# Patient Record
Sex: FEMALE | Race: BLACK OR AFRICAN AMERICAN | NOT HISPANIC OR LATINO | Employment: UNEMPLOYED | URBAN - NONMETROPOLITAN AREA
[De-identification: names, ages, dates, MRNs, and addresses within clinical notes are randomized per-mention and may not be internally consistent; named-entity substitution may affect disease eponyms.]

---

## 2021-11-28 ENCOUNTER — HISTORICAL (OUTPATIENT)
Dept: ADMINISTRATIVE | Facility: HOSPITAL | Age: 51
End: 2021-11-28

## 2021-11-29 ENCOUNTER — HISTORICAL (OUTPATIENT)
Dept: ADMINISTRATIVE | Facility: HOSPITAL | Age: 51
End: 2021-11-29

## 2021-12-01 ENCOUNTER — HISTORICAL (OUTPATIENT)
Dept: ADMINISTRATIVE | Facility: HOSPITAL | Age: 51
End: 2021-12-01

## 2022-11-02 ENCOUNTER — HISTORICAL (OUTPATIENT)
Dept: ADMINISTRATIVE | Facility: HOSPITAL | Age: 52
End: 2022-11-02

## 2022-11-14 DIAGNOSIS — G56.03 CARPAL TUNNEL SYNDROME, BILATERAL: Primary | ICD-10-CM

## 2022-11-28 ENCOUNTER — OFFICE VISIT (OUTPATIENT)
Dept: ORTHOPEDICS | Facility: CLINIC | Age: 52
End: 2022-11-28
Payer: MEDICAID

## 2022-11-28 VITALS — HEIGHT: 62 IN | BODY MASS INDEX: 43.15 KG/M2 | WEIGHT: 234.5 LBS

## 2022-11-28 DIAGNOSIS — G56.03 CARPAL TUNNEL SYNDROME, BILATERAL: Primary | ICD-10-CM

## 2022-11-28 PROCEDURE — 3008F BODY MASS INDEX DOCD: CPT | Mod: CPTII,S$GLB,, | Performed by: ORTHOPAEDIC SURGERY

## 2022-11-28 PROCEDURE — 20526 CARPAL TUNNEL: ICD-10-PCS | Mod: 50,S$GLB,, | Performed by: ORTHOPAEDIC SURGERY

## 2022-11-28 PROCEDURE — 4010F PR ACE/ARB THEARPY RXD/TAKEN: ICD-10-PCS | Mod: CPTII,S$GLB,, | Performed by: ORTHOPAEDIC SURGERY

## 2022-11-28 PROCEDURE — 99202 OFFICE O/P NEW SF 15 MIN: CPT | Mod: 25,S$GLB,, | Performed by: ORTHOPAEDIC SURGERY

## 2022-11-28 PROCEDURE — 1159F MED LIST DOCD IN RCRD: CPT | Mod: CPTII,S$GLB,, | Performed by: ORTHOPAEDIC SURGERY

## 2022-11-28 PROCEDURE — 20526 THER INJECTION CARP TUNNEL: CPT | Mod: 50,S$GLB,, | Performed by: ORTHOPAEDIC SURGERY

## 2022-11-28 PROCEDURE — 1159F PR MEDICATION LIST DOCUMENTED IN MEDICAL RECORD: ICD-10-PCS | Mod: CPTII,S$GLB,, | Performed by: ORTHOPAEDIC SURGERY

## 2022-11-28 PROCEDURE — 4010F ACE/ARB THERAPY RXD/TAKEN: CPT | Mod: CPTII,S$GLB,, | Performed by: ORTHOPAEDIC SURGERY

## 2022-11-28 PROCEDURE — 99202 PR OFFICE/OUTPT VISIT, NEW, LEVL II, 15-29 MIN: ICD-10-PCS | Mod: 25,S$GLB,, | Performed by: ORTHOPAEDIC SURGERY

## 2022-11-28 PROCEDURE — 3008F PR BODY MASS INDEX (BMI) DOCUMENTED: ICD-10-PCS | Mod: CPTII,S$GLB,, | Performed by: ORTHOPAEDIC SURGERY

## 2022-11-28 PROCEDURE — 1160F PR REVIEW ALL MEDS BY PRESCRIBER/CLIN PHARMACIST DOCUMENTED: ICD-10-PCS | Mod: CPTII,S$GLB,, | Performed by: ORTHOPAEDIC SURGERY

## 2022-11-28 PROCEDURE — 1160F RVW MEDS BY RX/DR IN RCRD: CPT | Mod: CPTII,S$GLB,, | Performed by: ORTHOPAEDIC SURGERY

## 2022-11-28 RX ORDER — NAPROXEN 500 MG/1
500 TABLET ORAL 2 TIMES DAILY PRN
COMMUNITY
Start: 2022-10-07

## 2022-11-28 RX ORDER — ATORVASTATIN CALCIUM 20 MG/1
20 TABLET, FILM COATED ORAL NIGHTLY
COMMUNITY
Start: 2022-11-05 | End: 2023-03-20

## 2022-11-28 RX ORDER — NIFEDIPINE 60 MG/1
60 TABLET, EXTENDED RELEASE ORAL DAILY
COMMUNITY
Start: 2022-11-05

## 2022-11-28 RX ORDER — ROSUVASTATIN CALCIUM 20 MG/1
20 TABLET, COATED ORAL NIGHTLY
COMMUNITY
Start: 2022-11-09 | End: 2023-03-20 | Stop reason: SDUPTHER

## 2022-11-28 RX ORDER — FUROSEMIDE 40 MG/1
40 TABLET ORAL DAILY
COMMUNITY
Start: 2022-11-15

## 2022-11-28 RX ORDER — POTASSIUM CHLORIDE 20 MEQ/1
20 TABLET, EXTENDED RELEASE ORAL DAILY
COMMUNITY
Start: 2022-11-15

## 2022-11-28 RX ORDER — LABETALOL 100 MG/1
100 TABLET, FILM COATED ORAL 2 TIMES DAILY
COMMUNITY
Start: 2022-11-05

## 2022-11-28 NOTE — PROGRESS NOTES
Subjective:      Patient ID: Kaylyn Whyte is a 52 y.o. female.    Chief Complaint: Pain of the Right Hand and Pain of the Left Hand    HPI 52-year-old lady comes in with a 1 year history of bilateral hand numbness worse on the right than left.  She is right-hand-dominant.  She has EMG and nerve conduction studies showing moderate carpal tunnel syndrome on the right and severe on the left.    Review of Systems   Constitutional: Negative for fever and weight loss.   Cardiovascular:  Negative for chest pain and leg swelling.   Musculoskeletal:  Negative for arthritis, joint pain, joint swelling, muscle weakness and stiffness.   Gastrointestinal:  Negative for change in bowel habit.   Genitourinary:  Negative for bladder incontinence and hematuria.   Neurological:  Positive for numbness, paresthesias and sensory change. Negative for focal weakness.       Objective:      Active and passive range motion of both wrists is normal.  She has decreased sensation to light touch in the median nerve distribution.  There is normal capillary refill.  She has a positive carpal compression test bilaterally      Ortho/SPM Exam            Assessment:       Encounter Diagnosis   Name Primary?    Carpal tunnel syndrome, bilateral           Plan:       Kaylyn was seen today for pain and pain.    Diagnoses and all orders for this visit:    Carpal tunnel syndrome, bilateral  -     Ambulatory referral/consult to Orthopedics    I discussed with her nonoperative and operative treatment.  She would like to try injections 1st.  This is done today.  Return 3 weeks p.r.n.

## 2022-11-28 NOTE — PROCEDURES
Carpal Tunnel    Date/Time: 11/28/2022 9:30 AM  Performed by: Rancho Zamudio MD  Authorized by: Rancho Zamudio MD     Consent Done?:  Yes (Verbal)  Prep: patient was prepped and draped in usual sterile fashion      Local anesthesia used?: No    Location:  Wrist  Site:  R carpal tunnel and L carpal tunnel  Needle size:  25 G  Approach:  Volar  Medications:  5 mg triamcinolone acetonide 10 mg/mL; 5 mg triamcinolone acetonide 10 mg/mL  Patient tolerance:  Patient tolerated the procedure well with no immediate complications

## 2022-12-19 ENCOUNTER — OFFICE VISIT (OUTPATIENT)
Dept: ORTHOPEDICS | Facility: CLINIC | Age: 52
End: 2022-12-19
Payer: MEDICAID

## 2022-12-19 DIAGNOSIS — G56.03 CARPAL TUNNEL SYNDROME, BILATERAL: Primary | ICD-10-CM

## 2022-12-19 PROCEDURE — 4010F ACE/ARB THERAPY RXD/TAKEN: CPT | Mod: CPTII,S$GLB,, | Performed by: ORTHOPAEDIC SURGERY

## 2022-12-19 PROCEDURE — 1160F PR REVIEW ALL MEDS BY PRESCRIBER/CLIN PHARMACIST DOCUMENTED: ICD-10-PCS | Mod: CPTII,S$GLB,, | Performed by: ORTHOPAEDIC SURGERY

## 2022-12-19 PROCEDURE — 1159F PR MEDICATION LIST DOCUMENTED IN MEDICAL RECORD: ICD-10-PCS | Mod: CPTII,S$GLB,, | Performed by: ORTHOPAEDIC SURGERY

## 2022-12-19 PROCEDURE — 99212 OFFICE O/P EST SF 10 MIN: CPT | Mod: S$GLB,,, | Performed by: ORTHOPAEDIC SURGERY

## 2022-12-19 PROCEDURE — 99212 PR OFFICE/OUTPT VISIT, EST, LEVL II, 10-19 MIN: ICD-10-PCS | Mod: S$GLB,,, | Performed by: ORTHOPAEDIC SURGERY

## 2022-12-19 PROCEDURE — 4010F PR ACE/ARB THEARPY RXD/TAKEN: ICD-10-PCS | Mod: CPTII,S$GLB,, | Performed by: ORTHOPAEDIC SURGERY

## 2022-12-19 PROCEDURE — 1159F MED LIST DOCD IN RCRD: CPT | Mod: CPTII,S$GLB,, | Performed by: ORTHOPAEDIC SURGERY

## 2022-12-19 PROCEDURE — 1160F RVW MEDS BY RX/DR IN RCRD: CPT | Mod: CPTII,S$GLB,, | Performed by: ORTHOPAEDIC SURGERY

## 2022-12-19 NOTE — PROGRESS NOTES
Subjective:      Patient ID: Kaylyn Whyte is a 52 y.o. female.    Chief Complaint: Pain of the Right Hand and Pain of the Left Hand    HPI 52-year-old lady comes in for recheck after injection in her carpal tunnels.  She states her left-hand is doing fine she still has some symptoms on the right.    ROS unchanged from prior visit      Objective:      Active and passive range motion of both wrists is normal.  She has a positive carpal compression test on the right and decreased sensation to light touch in the median nerve distribution.      Ortho/SPM Exam            Assessment:       Encounter Diagnosis   Name Primary?    Carpal tunnel syndrome, bilateral Yes          Plan:       Kaylyn was seen today for pain and pain.    Diagnoses and all orders for this visit:    Carpal tunnel syndrome, bilateral        She will contact us if she decides to proceed with carpal tunnel release.  Return

## 2023-02-03 DIAGNOSIS — R10.31 RIGHT LOWER QUADRANT PAIN: Primary | ICD-10-CM

## 2023-02-03 DIAGNOSIS — Z90.710 HISTORY OF HYSTERECTOMY: ICD-10-CM

## 2023-03-08 PROBLEM — G56.00 CARPAL TUNNEL SYNDROME: Status: ACTIVE | Noted: 2023-03-08

## 2023-03-08 PROBLEM — Z00.01 ABNORMAL PHYSICAL EVALUATION: Status: ACTIVE | Noted: 2023-03-08

## 2023-03-08 PROBLEM — I10 HYPERTENSION: Status: ACTIVE | Noted: 2023-03-08

## 2023-03-08 PROBLEM — E78.5 HYPERLIPIDEMIA: Status: ACTIVE | Noted: 2023-03-08

## 2023-03-08 PROBLEM — Z90.710 H/O TOTAL HYSTERECTOMY: Status: ACTIVE | Noted: 2023-03-08

## 2023-03-08 PROBLEM — E66.01 MORBID OBESITY: Status: ACTIVE | Noted: 2023-03-08

## 2023-03-08 PROCEDURE — 80061 LIPID PANEL: CPT | Performed by: NURSE PRACTITIONER

## 2023-03-08 PROCEDURE — 80053 COMPREHEN METABOLIC PANEL: CPT | Performed by: NURSE PRACTITIONER

## 2023-03-17 RX ORDER — ALBUTEROL SULFATE 90 UG/1
1 INHALANT RESPIRATORY (INHALATION) EVERY 6 HOURS PRN
COMMUNITY
Start: 2022-10-07 | End: 2023-03-20

## 2023-03-20 ENCOUNTER — OFFICE VISIT (OUTPATIENT)
Dept: FAMILY MEDICINE | Facility: CLINIC | Age: 53
End: 2023-03-20
Payer: MEDICAID

## 2023-03-20 ENCOUNTER — TELEPHONE (OUTPATIENT)
Dept: FAMILY MEDICINE | Facility: CLINIC | Age: 53
End: 2023-03-20

## 2023-03-20 DIAGNOSIS — Z13.31 POSITIVE DEPRESSION SCREENING: Primary | ICD-10-CM

## 2023-03-20 DIAGNOSIS — G56.03 BILATERAL CARPAL TUNNEL SYNDROME: ICD-10-CM

## 2023-03-20 DIAGNOSIS — E78.5 HYPERLIPIDEMIA, UNSPECIFIED HYPERLIPIDEMIA TYPE: ICD-10-CM

## 2023-03-20 DIAGNOSIS — R40.0 HAS DAYTIME DROWSINESS: ICD-10-CM

## 2023-03-20 DIAGNOSIS — R06.81 WITNESSED APNEIC SPELLS: ICD-10-CM

## 2023-03-20 DIAGNOSIS — F32.1 MODERATE MAJOR DEPRESSION: ICD-10-CM

## 2023-03-20 DIAGNOSIS — R06.83 SNORES: ICD-10-CM

## 2023-03-20 PROCEDURE — 1159F PR MEDICATION LIST DOCUMENTED IN MEDICAL RECORD: ICD-10-PCS | Mod: CPTII,95,, | Performed by: NURSE PRACTITIONER

## 2023-03-20 PROCEDURE — 1160F RVW MEDS BY RX/DR IN RCRD: CPT | Mod: CPTII,95,, | Performed by: NURSE PRACTITIONER

## 2023-03-20 PROCEDURE — 99213 OFFICE O/P EST LOW 20 MIN: CPT | Mod: 95,,, | Performed by: NURSE PRACTITIONER

## 2023-03-20 PROCEDURE — 99213 PR OFFICE/OUTPT VISIT, EST, LEVL III, 20-29 MIN: ICD-10-PCS | Mod: 95,,, | Performed by: NURSE PRACTITIONER

## 2023-03-20 PROCEDURE — 1159F MED LIST DOCD IN RCRD: CPT | Mod: CPTII,95,, | Performed by: NURSE PRACTITIONER

## 2023-03-20 PROCEDURE — 1160F PR REVIEW ALL MEDS BY PRESCRIBER/CLIN PHARMACIST DOCUMENTED: ICD-10-PCS | Mod: CPTII,95,, | Performed by: NURSE PRACTITIONER

## 2023-03-20 RX ORDER — ROSUVASTATIN CALCIUM 40 MG/1
40 TABLET, COATED ORAL NIGHTLY
Qty: 30 TABLET | Refills: 7 | Status: SHIPPED | OUTPATIENT
Start: 2023-03-20 | End: 2023-05-04 | Stop reason: SDUPTHER

## 2023-03-20 NOTE — ASSESSMENT & PLAN NOTE
Lipid Panel:  Lab Results   Component Value Date    CHOL 197 03/08/2023    HDL 58 03/08/2023    DLDL 113.3 (H) 03/08/2023    TRIG 53 03/08/2023    AST 26 03/08/2023    ALT 24 03/08/2023    ALKPHOS 99 03/08/2023    LABPROT 7.6 03/08/2023    ALBUMIN 4.4 03/08/2023

## 2023-03-20 NOTE — ASSESSMENT & PLAN NOTE
Was receiving injections for carpal tunnel with Dr. Zamudio with improvement in symptoms. He has retired. Patient requesting new referral.

## 2023-03-20 NOTE — TELEPHONE ENCOUNTER
----- Message from Stacia Corona LPN sent at 3/20/2023  8:08 AM CDT -----  Regarding: apt     Follow up for 3 month f/u cholesterol, Labs prior to apt. Call sooner if needed

## 2023-03-20 NOTE — PROGRESS NOTES
I have used clinical judgement based on duration and functional status to consider definite necessity for treatment. Patient ID: Kaylyn Whyte  : 1970     Chief Complaint: Follow-up (Cholesterol )    Allergies: Patient is allergic to penicillins.     This is a telemedicine note. Patient was treated using telemedicine, real time audio and video, according to [facility] protocols. [facility], conducted the visit from the [facility]. The patient participated in the visit at a non-[facility] location selected by the patient, identified below. I am licensed in the state where the patient stated they are located. The patient stated that they understood and accepted the privacy and security risks to their information at their location. The patient verbally consented to proceed with a telemedicine visit. This visit is not recorded. Patient was located at the patient's home.     Video Time Documentation:  Time of call:   Spent 22 minutes with patient face to face discussed health concerns. More than 50% of this time was spent in counseling and coordination of care.    History of Present Illness:  The patient is a 52 y.o. Black or  female who scheduled telemedicine visit for chief complaint of Follow-up (Cholesterol )   Patient is requesting referral to new orthopedic. Was seeing Dr. Zamudio for injections for bilateral carpal tunnel but he has retired.     Follow-up  This is a chronic problem. The current episode started more than 1 year ago. The problem has been gradually improving. Associated symptoms include arthralgias, headaches and joint swelling. Pertinent negatives include no chest pain, neck pain, vomiting or weakness.   Hyperlipidemia  This is a chronic problem. The current episode started more than 1 year ago. Exacerbating diseases include obesity. She has no history of chronic renal disease, diabetes, hypothyroidism, liver disease or nephrotic syndrome. Pertinent  negatives include no chest pain, focal sensory loss, focal weakness or shortness of breath. Risk factors for coronary artery disease include post-menopausal, a sedentary lifestyle, obesity, hypertension, dyslipidemia and family history.   Depression  Visit Type: initial  Onset of symptoms: 1 to 6 months ago  Progression since onset: gradually improving  Patient presents with the following symptoms: depressed mood, fatigue, insomnia, irritability, palpitations and restlessness.  Patient is not experiencing: anhedonia, chest pain, choking sensation, compulsions, confusion, decreased concentration, dizziness, dry mouth, excessive worry, feelings of hopelessness, feelings of worthlessness, hypersomnia, hyperventilation, malaise, memory impairment, muscle tension, nausea, nervousness/anxiety, obsessions, panic, psychomotor agitation, psychomotor retardation, shortness of breath, suicidal ideas, suicidal planning and thoughts of death.  Frequency of symptoms: most days   Severity: moderate   Aggravated by: family issues  Sleep quality: non-restorative  Risk factors: family history (grieving recent death of her brother from MI)  No history of: bipolar disorder, chronic lung disease, suicide attempt and substance abuse  Treatment tried: nothing       Past Medical History:  has a past medical history of Carpal tunnel syndrome, Hyperlipidemia, and Hypertension.    Social History:  reports that she has never smoked. She has never used smokeless tobacco. She reports that she does not currently use alcohol.    Family History: family history is not on file.    Care Team: Patient Care Team:  ABEBE Ibarra as PCP - General (Internal Medicine)  Ronaldo Villanueva MD as Consulting Physician (Obstetrics and Gynecology)  [facility]     Current Medications:  Current Outpatient Medications   Medication Instructions    furosemide (LASIX) 40 mg, Oral, Daily    labetaloL (NORMODYNE) 100 mg, Oral, 2 times daily    naproxen (NAPROSYN)  500 mg, Oral, 2 times daily PRN    NIFEdipine (PROCARDIA-XL) 60 mg, Oral, Daily    potassium chloride SA (K-DUR,KLOR-CON) 20 MEQ tablet 20 mEq, Oral, Daily    rosuvastatin (CRESTOR) 40 mg, Oral, Nightly       Patient is allergic to penicillins.     Review of Systems   Constitutional:  Positive for activity change and irritability. Negative for unexpected weight change.   HENT:  Positive for trouble swallowing. Negative for hearing loss and rhinorrhea.    Eyes:  Positive for visual disturbance. Negative for discharge.   Respiratory:  Negative for choking, chest tightness, shortness of breath and wheezing.    Cardiovascular:  Positive for palpitations. Negative for chest pain.   Gastrointestinal:  Positive for constipation. Negative for blood in stool, diarrhea and vomiting.   Endocrine: Positive for polydipsia and polyuria.   Genitourinary:  Negative for difficulty urinating, dysuria, hematuria and menstrual problem.   Musculoskeletal:  Positive for arthralgias and joint swelling. Negative for neck pain.   Neurological:  Positive for headaches. Negative for focal weakness and weakness.   Psychiatric/Behavioral:  Positive for depression and dysphoric mood. Negative for confusion, decreased concentration, substance abuse and suicidal ideas. The patient has insomnia. The patient is not nervous/anxious.       There were no vitals taken for this visit.    Physical Exam: LIMITED DUE TO TELEMEDICINE RESTRICTIONS.  General: Well nourished, Alert and oriented, No acute distress.  ENMT: Sclera non-icteric, conversational hearing normal.  Respiratory: Non-labored respirations, no dyspnea with conversation.  Musculoskeletal: normal movement of all extremities that are visible in video frame  Integumentary:  No visible suspicious lesions or rashes. No diaphoresis noted.   Psychiatric: Normal interaction, Coherent speech, Euthymic mood, Appropriate affect     Assessment & Plan:  1. Positive depression screening  Comments:  I have  reviewed the positive depression score which warrants active treatment with psychotherapy and/or medications.    2. Hyperlipidemia, unspecified hyperlipidemia type  Overview:  Taking rosuvastatin 20, increase to 40mg and repeat labs in 3-4 months    Assessment & Plan:  Lipid Panel:  Lab Results   Component Value Date    CHOL 197 03/08/2023    HDL 58 03/08/2023    DLDL 113.3 (H) 03/08/2023    TRIG 53 03/08/2023    AST 26 03/08/2023    ALT 24 03/08/2023    ALKPHOS 99 03/08/2023    LABPROT 7.6 03/08/2023    ALBUMIN 4.4 03/08/2023          Orders:  -     rosuvastatin (CRESTOR) 40 MG Tab; Take 1 tablet (40 mg total) by mouth every evening.  Dispense: 30 tablet; Refill: 7    3. Moderate major depression  Overview:  PHQ9 3/20/2023   Total Score 14   patient declines medication, provided patient with contact information to schedule apt with counselor      4. Snores  -     Ambulatory referral/consult to Sleep Disorders; Future; Expected date: 03/27/2023    5. Witnessed apneic spells  -     Ambulatory referral/consult to Sleep Disorders; Future; Expected date: 03/27/2023    6. Bilateral carpal tunnel syndrome  Assessment & Plan:  Was receiving injections for carpal tunnel with Dr. Zamudio with improvement in symptoms. He has retired. Patient requesting new referral.     Orders:  -     Ambulatory referral/consult to Orthopedics; Future; Expected date: 03/27/2023    7. Has daytime drowsiness  -     Ambulatory referral/consult to Sleep Disorders; Future; Expected date: 03/27/2023         Future Appointments   Date Time Provider Department Center   4/5/2023  3:00 PM Ronaldo Villanueva MD [facility]   11/6/2023  8:45 AM LAB, [facility] LABORATORY DRAW [facility]   11/13/2023  1:30 PM ABEBE Ibarra [facility]       Follow up for 3 month f/u cholesterol, Labs prior to apt. Call sooner if needed.    Lab Frequency Next Occurrence   Ambulatory referral/consult to Obstetrics / Gynecology Once  02/10/2023   Ambulatory referral/consult to Orthopedics Once 03/27/2023   Ambulatory referral/consult to Sleep Disorders Once 03/27/2023   Comprehensive Metabolic Panel Once 06/20/2023   Lipid Panel Once 06/20/2023       ABEBE BAKER

## 2023-04-05 ENCOUNTER — OFFICE VISIT (OUTPATIENT)
Dept: OBSTETRICS AND GYNECOLOGY | Facility: CLINIC | Age: 53
End: 2023-04-05
Payer: MEDICAID

## 2023-04-05 VITALS
HEIGHT: 63 IN | SYSTOLIC BLOOD PRESSURE: 136 MMHG | WEIGHT: 240.06 LBS | BODY MASS INDEX: 42.54 KG/M2 | DIASTOLIC BLOOD PRESSURE: 86 MMHG

## 2023-04-05 DIAGNOSIS — R10.31 RIGHT LOWER QUADRANT PAIN: Primary | ICD-10-CM

## 2023-04-05 DIAGNOSIS — Z90.710 HISTORY OF HYSTERECTOMY: ICD-10-CM

## 2023-04-05 PROCEDURE — 3075F SYST BP GE 130 - 139MM HG: CPT | Mod: CPTII,,, | Performed by: OBSTETRICS & GYNECOLOGY

## 2023-04-05 PROCEDURE — 3079F PR MOST RECENT DIASTOLIC BLOOD PRESSURE 80-89 MM HG: ICD-10-PCS | Mod: CPTII,,, | Performed by: OBSTETRICS & GYNECOLOGY

## 2023-04-05 PROCEDURE — 1159F PR MEDICATION LIST DOCUMENTED IN MEDICAL RECORD: ICD-10-PCS | Mod: CPTII,,, | Performed by: OBSTETRICS & GYNECOLOGY

## 2023-04-05 PROCEDURE — 99204 PR OFFICE/OUTPT VISIT, NEW, LEVL IV, 45-59 MIN: ICD-10-PCS | Mod: ,,, | Performed by: OBSTETRICS & GYNECOLOGY

## 2023-04-05 PROCEDURE — 3008F PR BODY MASS INDEX (BMI) DOCUMENTED: ICD-10-PCS | Mod: CPTII,,, | Performed by: OBSTETRICS & GYNECOLOGY

## 2023-04-05 PROCEDURE — 1159F MED LIST DOCD IN RCRD: CPT | Mod: CPTII,,, | Performed by: OBSTETRICS & GYNECOLOGY

## 2023-04-05 PROCEDURE — 3008F BODY MASS INDEX DOCD: CPT | Mod: CPTII,,, | Performed by: OBSTETRICS & GYNECOLOGY

## 2023-04-05 PROCEDURE — 99204 OFFICE O/P NEW MOD 45 MIN: CPT | Mod: ,,, | Performed by: OBSTETRICS & GYNECOLOGY

## 2023-04-05 PROCEDURE — 3075F PR MOST RECENT SYSTOLIC BLOOD PRESS GE 130-139MM HG: ICD-10-PCS | Mod: CPTII,,, | Performed by: OBSTETRICS & GYNECOLOGY

## 2023-04-05 PROCEDURE — 3079F DIAST BP 80-89 MM HG: CPT | Mod: CPTII,,, | Performed by: OBSTETRICS & GYNECOLOGY

## 2023-04-05 NOTE — PROGRESS NOTES
Chief Complaint     Right sided pain    HPI:     Patient is a 52 y.o.  presents today as a new patient here today with complaints of right sided pelvic pain x4 months.  S/p Hyst BSO  for menorrhagia and dysmenorrhea. Denies hx of abnormal pap, STD. Reports RLQ pain since hysterectomy, states has been worsening over the past 4 months. States in the last 2 months has noticed pain to radiate towards lower back. Not currently sexually active. Denies vaginal discharge, odor, bleeding. Will take tylenol as needed, does not noticed relief in pain. Reports pain to be 6 out of 10 daily and 10 out of 10 occ. Does not notice change in pain with BM. Denies recent pelvic u/s.      S/P Hyst BS&O  MMG 2022, Benign       Past Medical History:   Diagnosis Date    Carpal tunnel syndrome 3/8/2023    Hyperlipidemia 3/8/2023    Taking rosuvastatin 20    Hypertension        Past Surgical History:   Procedure Laterality Date    TOTAL ABDOMINAL HYSTERECTOMY W/ BILATERAL SALPINGOOPHORECTOMY         Family History   Problem Relation Age of Onset    No Known Problems Paternal Grandfather     No Known Problems Paternal Grandmother     Thyroid cancer Maternal Grandmother     No Known Problems Maternal Grandfather     No Known Problems Father     No Known Problems Mother     No Known Problems Brother     No Known Problems Sister        OB History          2    Para   2    Term   2            AB        Living   2         SAB        IAB        Ectopic        Multiple        Live Births   2                 Current Outpatient Medications on File Prior to Visit   Medication Sig Dispense Refill    furosemide (LASIX) 40 MG tablet Take 40 mg by mouth once daily.      labetaloL (NORMODYNE) 100 MG tablet Take 100 mg by mouth 2 (two) times daily.      naproxen (NAPROSYN) 500 MG tablet Take 500 mg by mouth 2 (two) times daily as needed.      NIFEdipine (PROCARDIA-XL) 60 MG (OSM) 24 hr tablet Take 60 mg by mouth once  "daily.      potassium chloride SA (K-DUR,KLOR-CON) 20 MEQ tablet Take 20 mEq by mouth once daily.      rosuvastatin (CRESTOR) 40 MG Tab Take 1 tablet (40 mg total) by mouth every evening. 30 tablet 7     No current facility-administered medications on file prior to visit.       Review of Systems:       Review of Systems   Constitutional:  Negative for chills and fever.   Gastrointestinal:  Negative for abdominal pain, constipation and diarrhea.   Genitourinary:  Positive for pelvic pain. Negative for bladder incontinence, decreased libido, dysmenorrhea, dyspareunia, dysuria, flank pain, frequency, genital sores, hematuria, hot flashes, menorrhagia, menstrual problem, urgency, vaginal bleeding, vaginal discharge, vaginal pain, urinary incontinence, postcoital bleeding, postmenopausal bleeding, vaginal dryness and vaginal odor.      Physical Exam:    /86   Ht 5' 2.99" (1.6 m)   Wt 108.9 kg (240 lb 1.3 oz)   LMP  (LMP Unknown) Comment: HYST BS&O  BMI 42.54 kg/m²     Physical Exam   General Exam:    General Appearance: alert, in no acute distress, normal, well nourished.  Psych:  Orientation: time, place, person.  Mood/Affect: Normal   Abdomen:  - Soft. Non-tender. No rebound tenderness or guardingNo masses. Liver normal. Spleen normal. No hernia palpable.  Pelvis:   - Vulva: Normal   - Perianal skin: Normal   - Urethra: Normal meatus. Q-tip: Not performed   - Vagina: NormalVaginal discharge present: . Cystocele: Absent. Rectocele: Absent   - vaginal cuff intact.   Mild right adnexal tenderness.       Assessment:   1. Right lower quadrant pain  -     Ambulatory referral/consult to Obstetrics / Gynecology  -     US Pelvis Comp with Transvag NON-OB (xpd; Future; Expected date: 04/06/2023    2. History of hysterectomy  -     Ambulatory referral/consult to Obstetrics / Gynecology       Plan:       US pelvis  Discussed potential sources of pelvic pain  RTC 2 weeks for f/u      "

## 2023-04-12 ENCOUNTER — PATIENT MESSAGE (OUTPATIENT)
Dept: FAMILY MEDICINE | Facility: CLINIC | Age: 53
End: 2023-04-12
Payer: MEDICAID

## 2023-04-12 ENCOUNTER — HOSPITAL ENCOUNTER (OUTPATIENT)
Dept: RADIOLOGY | Facility: HOSPITAL | Age: 53
Discharge: HOME | End: 2023-04-12
Attending: OBSTETRICS & GYNECOLOGY
Payer: MEDICAID

## 2023-04-12 DIAGNOSIS — R10.31 RIGHT LOWER QUADRANT PAIN: ICD-10-CM

## 2023-04-12 DIAGNOSIS — G56.03 BILATERAL CARPAL TUNNEL SYNDROME: Primary | ICD-10-CM

## 2023-04-12 PROCEDURE — 76856 US EXAM PELVIC COMPLETE: CPT | Mod: TC

## 2023-04-12 NOTE — TELEPHONE ENCOUNTER
I have written for the following medications and/or orders for the patient.  Please notify the patient.  Orders Placed This Encounter   Procedures    Ambulatory referral/consult to Orthopedics     Standing Status:   Future     Standing Expiration Date:   5/12/2024     Referral Priority:   Routine     Referral Type:   Consultation     Requested Specialty:   Orthopedic Surgery     Number of Visits Requested:   1

## 2023-04-19 ENCOUNTER — OFFICE VISIT (OUTPATIENT)
Dept: OBSTETRICS AND GYNECOLOGY | Facility: CLINIC | Age: 53
End: 2023-04-19
Payer: MEDICAID

## 2023-04-19 VITALS
SYSTOLIC BLOOD PRESSURE: 136 MMHG | HEIGHT: 62 IN | WEIGHT: 242.63 LBS | BODY MASS INDEX: 44.65 KG/M2 | DIASTOLIC BLOOD PRESSURE: 84 MMHG

## 2023-04-19 DIAGNOSIS — Z90.710 H/O TOTAL HYSTERECTOMY: ICD-10-CM

## 2023-04-19 DIAGNOSIS — R10.31 CHRONIC RLQ PAIN: Primary | ICD-10-CM

## 2023-04-19 DIAGNOSIS — G89.29 CHRONIC RLQ PAIN: Primary | ICD-10-CM

## 2023-04-19 DIAGNOSIS — R10.31 RIGHT LOWER QUADRANT PAIN: ICD-10-CM

## 2023-04-19 DIAGNOSIS — R10.2 PELVIC PAIN: ICD-10-CM

## 2023-04-19 PROCEDURE — 3079F PR MOST RECENT DIASTOLIC BLOOD PRESSURE 80-89 MM HG: ICD-10-PCS | Mod: CPTII,,, | Performed by: OBSTETRICS & GYNECOLOGY

## 2023-04-19 PROCEDURE — 99213 OFFICE O/P EST LOW 20 MIN: CPT | Mod: ,,, | Performed by: OBSTETRICS & GYNECOLOGY

## 2023-04-19 PROCEDURE — 99213 PR OFFICE/OUTPT VISIT, EST, LEVL III, 20-29 MIN: ICD-10-PCS | Mod: ,,, | Performed by: OBSTETRICS & GYNECOLOGY

## 2023-04-19 PROCEDURE — 3008F PR BODY MASS INDEX (BMI) DOCUMENTED: ICD-10-PCS | Mod: CPTII,,, | Performed by: OBSTETRICS & GYNECOLOGY

## 2023-04-19 PROCEDURE — 3008F BODY MASS INDEX DOCD: CPT | Mod: CPTII,,, | Performed by: OBSTETRICS & GYNECOLOGY

## 2023-04-19 PROCEDURE — 1159F PR MEDICATION LIST DOCUMENTED IN MEDICAL RECORD: ICD-10-PCS | Mod: CPTII,,, | Performed by: OBSTETRICS & GYNECOLOGY

## 2023-04-19 PROCEDURE — 3075F SYST BP GE 130 - 139MM HG: CPT | Mod: CPTII,,, | Performed by: OBSTETRICS & GYNECOLOGY

## 2023-04-19 PROCEDURE — 3079F DIAST BP 80-89 MM HG: CPT | Mod: CPTII,,, | Performed by: OBSTETRICS & GYNECOLOGY

## 2023-04-19 PROCEDURE — 3075F PR MOST RECENT SYSTOLIC BLOOD PRESS GE 130-139MM HG: ICD-10-PCS | Mod: CPTII,,, | Performed by: OBSTETRICS & GYNECOLOGY

## 2023-04-19 PROCEDURE — 1159F MED LIST DOCD IN RCRD: CPT | Mod: CPTII,,, | Performed by: OBSTETRICS & GYNECOLOGY

## 2023-04-19 NOTE — PROGRESS NOTES
Chief Complaint     Follow-up (US pelvic result)    HPI:     Patient is a 52 y.o.  presents to follow up TVUS for RLQ pain. Reports dull pain daily. Pain will increase from RLQ/lower back with movement, walking. Pain has been present since hysterectomy, , worsening in the last 4-5 months. Denies recent sexual activity. Has not noticed pain to be present during intercourse in past. Reports colonoscopy 3-5 years ago, WNL. Denies difficulty with BM.     TVUS 23:  FINDINGS:  Uterus: The patient is post hysterectomy.  Ovaries: The patient is post bilateral oophorectomy.  Miscellaneous: There is no evidence of free fluid or worrisome solid or cystic masses within the pelvis or either adnexal region.  The patient was moderately tender while scanning over the right lower quadrant of the abdomen and right adnexal region.  Impression:  1. The patient is post hysterectomy and bilateral oophorectomy.  2. The patient was moderately tender while scanning over the right lower quadrant of the abdomen and right adnexal region with no obvious anatomic abnormalities appreciated.    S/P Hyst BS&O  MMG 2022, Benign  Hx abnormal MMG: No  Sexually active: Not currently  H/o STI: No   H/o Abnormal Pap: No   Colonoscopy: 2019 WNL, Repeat in 10 yrs           Past Medical History:   Diagnosis Date    Carpal tunnel syndrome 3/8/2023    Hyperlipidemia 3/8/2023    Taking rosuvastatin 20    Hypertension        Past Surgical History:   Procedure Laterality Date    TOTAL ABDOMINAL HYSTERECTOMY W/ BILATERAL SALPINGOOPHORECTOMY         Family History   Problem Relation Age of Onset    No Known Problems Paternal Grandfather     No Known Problems Paternal Grandmother     Thyroid cancer Maternal Grandmother     No Known Problems Maternal Grandfather     No Known Problems Father     No Known Problems Mother     No Known Problems Brother     No Known Problems Sister        OB History          2    Para   2    Term   2     "        AB        Living   2         SAB        IAB        Ectopic        Multiple        Live Births   2                 Current Outpatient Medications on File Prior to Visit   Medication Sig Dispense Refill    furosemide (LASIX) 40 MG tablet Take 40 mg by mouth once daily.      labetaloL (NORMODYNE) 100 MG tablet Take 100 mg by mouth 2 (two) times daily.      naproxen (NAPROSYN) 500 MG tablet Take 500 mg by mouth 2 (two) times daily as needed.      NIFEdipine (PROCARDIA-XL) 60 MG (OSM) 24 hr tablet Take 60 mg by mouth once daily.      potassium chloride SA (K-DUR,KLOR-CON) 20 MEQ tablet Take 20 mEq by mouth once daily.      rosuvastatin (CRESTOR) 40 MG Tab Take 1 tablet (40 mg total) by mouth every evening. 30 tablet 7     No current facility-administered medications on file prior to visit.       Review of Systems:       Review of Systems   Constitutional:  Negative for chills and fever.   Gastrointestinal:  Positive for abdominal pain. Negative for constipation and diarrhea.   Genitourinary:  Positive for pelvic pain. Negative for bladder incontinence, decreased libido, dysmenorrhea, dyspareunia, dysuria, flank pain, frequency, genital sores, hematuria, hot flashes, menorrhagia, menstrual problem, urgency, vaginal bleeding, vaginal discharge, vaginal pain, urinary incontinence, postcoital bleeding, postmenopausal bleeding, vaginal dryness and vaginal odor.      Physical Exam:    /84 (BP Location: Left arm, Patient Position: Sitting)   Ht 5' 2" (1.575 m)   Wt 110 kg (242 lb 9.6 oz)   LMP  (LMP Unknown) Comment: HYST BS&O  BMI 44.37 kg/m²     Physical Exam     Deferred    Assessment:   1. Chronic RLQ pain  -     CT Abdomen Pelvis With Contrast; Future; Expected date: 2023    2. Pelvic pain  -     Comprehensive Metabolic Panel; Future; Expected date: 2023  -     Follicle Stimulating Hormone; Future; Expected date: 2023  -     Luteinizing Hormone; Future; Expected date: " 04/26/2023  -     Estradiol; Future; Expected date: 04/26/2023  -     CT Abdomen Pelvis With Contrast; Future; Expected date: 04/20/2023    3. H/O total hysterectomy  -     Comprehensive Metabolic Panel; Future; Expected date: 04/26/2023  -     Follicle Stimulating Hormone; Future; Expected date: 04/26/2023  -     Luteinizing Hormone; Future; Expected date: 04/26/2023  -     Estradiol; Future; Expected date: 04/26/2023  -     CT Abdomen Pelvis With Contrast; Future; Expected date: 04/20/2023    4. Right lower quadrant pain  -     CT Abdomen Pelvis With Contrast; Future; Expected date: 04/20/2023             Plan:   Reviewed TVUS with patient  Stool softeners in avoidance of constipation  Discussed possibility of scar tissue to be causing pain.     Recommend CT abd/pelvis with IV/PO contrast as US inconclusive.    CT abdomen and pelvis  [facility]   LH  Estradiol  [facility]    RTC for results

## 2023-04-21 ENCOUNTER — LAB VISIT (OUTPATIENT)
Dept: LAB | Facility: HOSPITAL | Age: 53
End: 2023-04-21
Attending: OBSTETRICS & GYNECOLOGY
Payer: MEDICAID

## 2023-04-21 DIAGNOSIS — R10.2 PELVIC PAIN: ICD-10-CM

## 2023-04-21 DIAGNOSIS — Z90.710 H/O TOTAL HYSTERECTOMY: ICD-10-CM

## 2023-04-21 LAB
ALBUMIN SERPL-MCNC: 4.5 G/DL (ref 3.4–5)
ALBUMIN/GLOB SERPL: 1.4 RATIO
ALP SERPL-CCNC: 104 UNIT/L (ref 50–144)
ALT SERPL-CCNC: 26 UNIT/L (ref 1–45)
ANION GAP SERPL CALC-SCNC: 7 MEQ/L (ref 2–13)
AST SERPL-CCNC: 25 UNIT/L (ref 14–36)
BILIRUBIN DIRECT+TOT PNL SERPL-MCNC: 0.4 MG/DL (ref 0–1)
BUN SERPL-MCNC: 13 MG/DL (ref 7–20)
CALCIUM SERPL-MCNC: 9.4 MG/DL (ref 8.4–10.2)
CHLORIDE SERPL-SCNC: 107 MMOL/L (ref 98–110)
CO2 SERPL-SCNC: 27 MMOL/L (ref 21–32)
CREAT SERPL-MCNC: 0.57 MG/DL (ref 0.66–1.25)
CREAT/UREA NIT SERPL: 23 (ref 12–20)
GFR SERPLBLD CREATININE-BSD FMLA CKD-EPI: >90 MLS/MIN/1.73/M2
GLOBULIN SER-MCNC: 3.3 GM/DL (ref 2–3.9)
GLUCOSE SERPL-MCNC: 95 MG/DL (ref 70–115)
POTASSIUM SERPL-SCNC: 3.7 MMOL/L (ref 3.5–5.1)
PROT SERPL-MCNC: 7.8 GM/DL (ref 6.3–8.2)
SODIUM SERPL-SCNC: 141 MMOL/L (ref 135–145)

## 2023-04-21 PROCEDURE — 80053 COMPREHEN METABOLIC PANEL: CPT

## 2023-04-21 PROCEDURE — 36415 COLL VENOUS BLD VENIPUNCTURE: CPT

## 2023-04-21 PROCEDURE — 83001 ASSAY OF GONADOTROPIN (FSH): CPT

## 2023-04-21 PROCEDURE — 82670 ASSAY OF TOTAL ESTRADIOL: CPT

## 2023-04-21 PROCEDURE — 83002 ASSAY OF GONADOTROPIN (LH): CPT

## 2023-04-22 LAB
ESTRADIOL SERPL HS-MCNC: 51 PG/ML
FSH SERPL-ACNC: 60.24 MIU/ML
LH SERPL-ACNC: 25.5 MIU/ML

## 2023-04-24 ENCOUNTER — HOSPITAL ENCOUNTER (OUTPATIENT)
Dept: RADIOLOGY | Facility: HOSPITAL | Age: 53
Discharge: HOME | End: 2023-04-24
Attending: OBSTETRICS & GYNECOLOGY
Payer: MEDICAID

## 2023-04-24 DIAGNOSIS — Z90.710 H/O TOTAL HYSTERECTOMY: ICD-10-CM

## 2023-04-24 DIAGNOSIS — G89.29 CHRONIC RLQ PAIN: ICD-10-CM

## 2023-04-24 DIAGNOSIS — R10.2 PELVIC PAIN: ICD-10-CM

## 2023-04-24 DIAGNOSIS — R10.31 RIGHT LOWER QUADRANT PAIN: ICD-10-CM

## 2023-04-24 DIAGNOSIS — R10.31 CHRONIC RLQ PAIN: ICD-10-CM

## 2023-04-24 PROCEDURE — A9698 NON-RAD CONTRAST MATERIALNOC: HCPCS | Performed by: OBSTETRICS & GYNECOLOGY

## 2023-04-24 PROCEDURE — 74177 CT ABD & PELVIS W/CONTRAST: CPT | Mod: TC

## 2023-04-24 PROCEDURE — 25500020 PHARM REV CODE 255: Performed by: OBSTETRICS & GYNECOLOGY

## 2023-04-24 RX ADMIN — IOPAMIDOL 100 ML: 755 INJECTION, SOLUTION INTRAVENOUS at 09:04

## 2023-04-24 RX ADMIN — BARIUM SULFATE 900 ML: 20 SUSPENSION ORAL at 07:04

## 2023-05-04 DIAGNOSIS — E78.5 HYPERLIPIDEMIA, UNSPECIFIED HYPERLIPIDEMIA TYPE: ICD-10-CM

## 2023-05-04 RX ORDER — ROSUVASTATIN CALCIUM 40 MG/1
40 TABLET, COATED ORAL NIGHTLY
Qty: 30 TABLET | Refills: 7 | Status: SHIPPED | OUTPATIENT
Start: 2023-05-04 | End: 2023-06-20 | Stop reason: SDUPTHER

## 2023-05-17 ENCOUNTER — PATIENT MESSAGE (OUTPATIENT)
Dept: FAMILY MEDICINE | Facility: CLINIC | Age: 53
End: 2023-05-17
Payer: MEDICAID

## 2023-05-19 NOTE — TELEPHONE ENCOUNTER
Unfortunately these medications are only covered with a diagnosis of diabetes. Patient can call healthy [facility] and price the injections they provide which are similar to ozempic/wegovy.

## 2023-05-24 ENCOUNTER — OFFICE VISIT (OUTPATIENT)
Dept: OBSTETRICS AND GYNECOLOGY | Facility: CLINIC | Age: 53
End: 2023-05-24
Payer: MEDICAID

## 2023-05-24 VITALS
BODY MASS INDEX: 45.12 KG/M2 | WEIGHT: 245.19 LBS | HEIGHT: 62 IN | SYSTOLIC BLOOD PRESSURE: 136 MMHG | DIASTOLIC BLOOD PRESSURE: 90 MMHG

## 2023-05-24 DIAGNOSIS — R10.31 CHRONIC RLQ PAIN: Primary | ICD-10-CM

## 2023-05-24 DIAGNOSIS — G89.29 CHRONIC RLQ PAIN: Primary | ICD-10-CM

## 2023-05-24 PROCEDURE — 99214 PR OFFICE/OUTPT VISIT, EST, LEVL IV, 30-39 MIN: ICD-10-PCS | Mod: ,,, | Performed by: OBSTETRICS & GYNECOLOGY

## 2023-05-24 PROCEDURE — 3075F SYST BP GE 130 - 139MM HG: CPT | Mod: CPTII,,, | Performed by: OBSTETRICS & GYNECOLOGY

## 2023-05-24 PROCEDURE — 3080F DIAST BP >= 90 MM HG: CPT | Mod: CPTII,,, | Performed by: OBSTETRICS & GYNECOLOGY

## 2023-05-24 PROCEDURE — 3075F PR MOST RECENT SYSTOLIC BLOOD PRESS GE 130-139MM HG: ICD-10-PCS | Mod: CPTII,,, | Performed by: OBSTETRICS & GYNECOLOGY

## 2023-05-24 PROCEDURE — 3008F PR BODY MASS INDEX (BMI) DOCUMENTED: ICD-10-PCS | Mod: CPTII,,, | Performed by: OBSTETRICS & GYNECOLOGY

## 2023-05-24 PROCEDURE — 1159F MED LIST DOCD IN RCRD: CPT | Mod: CPTII,,, | Performed by: OBSTETRICS & GYNECOLOGY

## 2023-05-24 PROCEDURE — 3080F PR MOST RECENT DIASTOLIC BLOOD PRESSURE >= 90 MM HG: ICD-10-PCS | Mod: CPTII,,, | Performed by: OBSTETRICS & GYNECOLOGY

## 2023-05-24 PROCEDURE — 3008F BODY MASS INDEX DOCD: CPT | Mod: CPTII,,, | Performed by: OBSTETRICS & GYNECOLOGY

## 2023-05-24 PROCEDURE — 1159F PR MEDICATION LIST DOCUMENTED IN MEDICAL RECORD: ICD-10-PCS | Mod: CPTII,,, | Performed by: OBSTETRICS & GYNECOLOGY

## 2023-05-24 PROCEDURE — 99214 OFFICE O/P EST MOD 30 MIN: CPT | Mod: ,,, | Performed by: OBSTETRICS & GYNECOLOGY

## 2023-05-24 NOTE — PROGRESS NOTES
Chief Complaint     Follow-up (Follow up on lab work and CT scan.)    HPI:     Patient is a 52 y.o.  s/p ELSIE in  with subsequent chronic RLQ pain presents to follow up labs and CT. Previous US was normal.  Reports continued pain, rebecca related to movement. States hx of HIDA scan due to gallbladder issues ~4 years ago. States did not remove at the time due to financial reasons. She denies current pain related to food intake.   Denies hx of HRT.    S/P Hyst BS&O  MMG 2022, Benign  Hx abnormal MMG: No  Sexually active: Not currently  H/o STI: No   H/o Abnormal Pap: No   Colonoscopy:  WNL, Repeat in 10 yrs         23:  Estradiol: 51  LH: 25.50  FSH: 60.24      CT Abdomen pelvis 23:  FINDINGS:  Liver: No clinically significant abnormalities are noted.  Gallbladder/biliary system: No clinically significant abnormalities are noted.  Spleen: No clinically significant abnormalities are noted.  Adrenal glands: No clinically significant abnormalities are noted.  Pancreas: No clinically significant abnormalities are noted.  Kidneys/ureters: No clinically significant abnormalities are noted.  Urinary bladder: No clinically significant abnormalities are noted.  Uterus and ovaries: The patient is post hysterectomy.  GI tract: No clinically significant abnormalities are noted.  Vascular structures: No clinically significant abnormalities are noted.  Musculoskeletal structures: No clinically significant abnormalities are noted.  Miscellaneous: N/A  Impression:  1. No clinically significant abnormalities are noted.  See above comments.      Per previous note 23:  Patient is a 52 y.o.  presents to follow up TVUS for RLQ pain. Reports dull pain daily. Pain will increase from RLQ/lower back with movement, walking. Pain has been present since hysterectomy, , worsening in the last 4-5 months. Denies recent sexual activity. Has not noticed pain to be present during intercourse in past.  Reports colonoscopy 3-5 years ago, WNL. Denies difficulty with BM.      TVUS 23:  FINDINGS:  Uterus: The patient is post hysterectomy.  Ovaries: The patient is post bilateral oophorectomy.  Miscellaneous: There is no evidence of free fluid or worrisome solid or cystic masses within the pelvis or either adnexal region.  The patient was moderately tender while scanning over the right lower quadrant of the abdomen and right adnexal region.  Impression:  1. The patient is post hysterectomy and bilateral oophorectomy.  2. The patient was moderately tender while scanning over the right lower quadrant of the abdomen and right adnexal region with no obvious anatomic abnormalities appreciated.      Past Medical History:   Diagnosis Date    Carpal tunnel syndrome 3/8/2023    Hyperlipidemia 3/8/2023    Taking rosuvastatin 20    Hypertension        Past Surgical History:   Procedure Laterality Date    TOTAL ABDOMINAL HYSTERECTOMY W/ BILATERAL SALPINGOOPHORECTOMY         Family History   Problem Relation Age of Onset    No Known Problems Paternal Grandfather     No Known Problems Paternal Grandmother     Thyroid cancer Maternal Grandmother     No Known Problems Maternal Grandfather     No Known Problems Father     No Known Problems Mother     No Known Problems Brother     No Known Problems Sister        OB History          2    Para   2    Term   2            AB        Living   2         SAB        IAB        Ectopic        Multiple        Live Births   2                 Current Outpatient Medications on File Prior to Visit   Medication Sig Dispense Refill    furosemide (LASIX) 40 MG tablet Take 40 mg by mouth once daily.      labetaloL (NORMODYNE) 100 MG tablet Take 100 mg by mouth 2 (two) times daily.      naproxen (NAPROSYN) 500 MG tablet Take 500 mg by mouth 2 (two) times daily as needed.      NIFEdipine (PROCARDIA-XL) 60 MG (OSM) 24 hr tablet Take 60 mg by mouth once daily.      potassium chloride SA  "(K-DUR,KLOR-CON) 20 MEQ tablet Take 20 mEq by mouth once daily.      rosuvastatin (CRESTOR) 40 MG Tab Take 1 tablet (40 mg total) by mouth every evening. 30 tablet 7     No current facility-administered medications on file prior to visit.       Review of Systems:       Review of Systems   Constitutional:  Negative for chills and fever.   Gastrointestinal:  Positive for abdominal pain. Negative for constipation and diarrhea.   Genitourinary:  Positive for pelvic pain. Negative for bladder incontinence, decreased libido, dysmenorrhea, dyspareunia, dysuria, flank pain, frequency, genital sores, hematuria, hot flashes, menorrhagia, menstrual problem, urgency, vaginal bleeding, vaginal discharge, vaginal pain, urinary incontinence, postcoital bleeding, postmenopausal bleeding, vaginal dryness and vaginal odor.      Physical Exam:    BP (!) 136/90 (BP Location: Right arm)   Ht 5' 2" (1.575 m)   Wt 111.2 kg (245 lb 3.2 oz)   LMP  (LMP Unknown) Comment: HYST BS&O  BMI 44.85 kg/m²     Physical Exam     Deferred    Assessment:   1. Chronic RLQ pain             Plan:       Reviewed CT results and lab results with patient.   Estradiol remains pre-menopausal level but FSH is elevated to postmenopausal level.  Possibly due to estrogen production from distal adipose tissue, exogenous intake (which she denies), or small ovarian remant.  We discussed next step in evaluation.  At this point, I would recommend diagnostic laparoscopy to r/o persistent adhesive diease or possible small ovarian remnant.   Discussed dx lap procedure in detal.  She wants to proceed.  Dx Lap planned for 7/12/23  RTC pre admit     "

## 2023-06-12 PROBLEM — Z00.01 ABNORMAL PHYSICAL EVALUATION: Status: RESOLVED | Noted: 2023-03-08 | Resolved: 2023-06-12

## 2023-06-14 ENCOUNTER — OFFICE VISIT (OUTPATIENT)
Dept: OBSTETRICS AND GYNECOLOGY | Facility: CLINIC | Age: 53
End: 2023-06-14
Payer: MEDICAID

## 2023-06-14 VITALS
HEIGHT: 62 IN | WEIGHT: 241 LBS | DIASTOLIC BLOOD PRESSURE: 84 MMHG | BODY MASS INDEX: 44.35 KG/M2 | SYSTOLIC BLOOD PRESSURE: 136 MMHG

## 2023-06-14 DIAGNOSIS — R10.31 CHRONIC RLQ PAIN: Primary | ICD-10-CM

## 2023-06-14 DIAGNOSIS — R10.2 PELVIC PAIN: ICD-10-CM

## 2023-06-14 DIAGNOSIS — G89.29 CHRONIC RLQ PAIN: Primary | ICD-10-CM

## 2023-06-14 PROCEDURE — 3079F PR MOST RECENT DIASTOLIC BLOOD PRESSURE 80-89 MM HG: ICD-10-PCS | Mod: CPTII,,, | Performed by: OBSTETRICS & GYNECOLOGY

## 2023-06-14 PROCEDURE — 3075F SYST BP GE 130 - 139MM HG: CPT | Mod: CPTII,,, | Performed by: OBSTETRICS & GYNECOLOGY

## 2023-06-14 PROCEDURE — 99213 OFFICE O/P EST LOW 20 MIN: CPT | Mod: ,,, | Performed by: OBSTETRICS & GYNECOLOGY

## 2023-06-14 PROCEDURE — 99213 PR OFFICE/OUTPT VISIT, EST, LEVL III, 20-29 MIN: ICD-10-PCS | Mod: ,,, | Performed by: OBSTETRICS & GYNECOLOGY

## 2023-06-14 PROCEDURE — 3008F PR BODY MASS INDEX (BMI) DOCUMENTED: ICD-10-PCS | Mod: CPTII,,, | Performed by: OBSTETRICS & GYNECOLOGY

## 2023-06-14 PROCEDURE — 3075F PR MOST RECENT SYSTOLIC BLOOD PRESS GE 130-139MM HG: ICD-10-PCS | Mod: CPTII,,, | Performed by: OBSTETRICS & GYNECOLOGY

## 2023-06-14 PROCEDURE — 3079F DIAST BP 80-89 MM HG: CPT | Mod: CPTII,,, | Performed by: OBSTETRICS & GYNECOLOGY

## 2023-06-14 PROCEDURE — 3008F BODY MASS INDEX DOCD: CPT | Mod: CPTII,,, | Performed by: OBSTETRICS & GYNECOLOGY

## 2023-06-14 RX ORDER — FAMOTIDINE 20 MG/1
20 TABLET, FILM COATED ORAL
Status: CANCELLED | OUTPATIENT
Start: 2023-06-14

## 2023-06-14 RX ORDER — MUPIROCIN 20 MG/G
OINTMENT TOPICAL
Status: CANCELLED | OUTPATIENT
Start: 2023-06-14

## 2023-06-14 RX ORDER — SODIUM CHLORIDE 9 MG/ML
INJECTION, SOLUTION INTRAVENOUS CONTINUOUS
Status: CANCELLED | OUTPATIENT
Start: 2023-06-14

## 2023-06-14 NOTE — H&P (VIEW-ONLY)
History & Physical    SUBJECTIVE:     History of Present Illness:  Patient is a 52 y.o. female  here today to pre admit for Dx Lap on 23 for evaluation of chronic RLQ pain present since ELSIE in .    Per previous note 23:  Patient is a 52 y.o.  s/p ELSIE in  with subsequent chronic RLQ pain presents to follow up labs and CT. Previous US was normal.  Reports continued pain, rebecca related to movement. States hx of HIDA scan due to gallbladder issues ~4 years ago. States did not remove at the time due to financial reasons. She denies current pain related to food intake.   Denies hx of HRT.     S/P Hyst BS&O  MMG 2022, Benign  Hx abnormal MMG: No  Sexually active: Not currently  H/o STI: No   H/o Abnormal Pap: No   Colonoscopy: 2019 WNL, Repeat in 10 yrs           23:  Estradiol: 52  LH: 25.50  FSH: 60.24        CT Abdomen pelvis 23:  FINDINGS:  Liver: No clinically significant abnormalities are noted.  Gallbladder/biliary system: No clinically significant abnormalities are noted.  Spleen: No clinically significant abnormalities are noted.  Adrenal glands: No clinically significant abnormalities are noted.  Pancreas: No clinically significant abnormalities are noted.  Kidneys/ureters: No clinically significant abnormalities are noted.  Urinary bladder: No clinically significant abnormalities are noted.  Uterus and ovaries: The patient is post hysterectomy.  GI tract: No clinically significant abnormalities are noted.  Vascular structures: No clinically significant abnormalities are noted.  Musculoskeletal structures: No clinically significant abnormalities are noted.  Miscellaneous: N/A  Impression:  1. No clinically significant abnormalities are noted.  See above comments.        Per previous note 23:  Patient is a 52 y.o.  presents to follow up TVUS for RLQ pain. Reports dull pain daily. Pain will increase from RLQ/lower back with movement, walking. Pain has been  present since hysterectomy, 2013, worsening in the last 4-5 months. Denies recent sexual activity. Has not noticed pain to be present during intercourse in past. Reports colonoscopy 3-5 years ago, WNL. Denies difficulty with BM.      TVUS 4/12/23:  FINDINGS:  Uterus: The patient is post hysterectomy.  Ovaries: The patient is post bilateral oophorectomy.  Miscellaneous: There is no evidence of free fluid or worrisome solid or cystic masses within the pelvis or either adnexal region.  The patient was moderately tender while scanning over the right lower quadrant of the abdomen and right adnexal region.  Impression:  1. The patient is post hysterectomy and bilateral oophorectomy.  2. The patient was moderately tender while scanning over the right lower quadrant of the abdomen and right adnexal region with no obvious anatomic abnormalities appreciated.         No chief complaint on file.      Review of patient's allergies indicates:   Allergen Reactions    Penicillins        Current Outpatient Medications   Medication Sig Dispense Refill    furosemide (LASIX) 40 MG tablet Take 40 mg by mouth once daily.      labetaloL (NORMODYNE) 100 MG tablet Take 100 mg by mouth 2 (two) times daily.      naproxen (NAPROSYN) 500 MG tablet Take 500 mg by mouth 2 (two) times daily as needed.      NIFEdipine (PROCARDIA-XL) 60 MG (OSM) 24 hr tablet Take 60 mg by mouth once daily.      potassium chloride SA (K-DUR,KLOR-CON) 20 MEQ tablet Take 20 mEq by mouth once daily.      rosuvastatin (CRESTOR) 40 MG Tab Take 1 tablet (40 mg total) by mouth every evening. 30 tablet 7     No current facility-administered medications for this visit.       Past Medical History:   Diagnosis Date    Carpal tunnel syndrome 3/8/2023    Hyperlipidemia 3/8/2023    Taking rosuvastatin 20    Hypertension      Past Surgical History:   Procedure Laterality Date    TOTAL ABDOMINAL HYSTERECTOMY W/ BILATERAL SALPINGOOPHORECTOMY  2013     Family History   Problem  Relation Age of Onset    No Known Problems Paternal Grandfather     No Known Problems Paternal Grandmother     Thyroid cancer Maternal Grandmother     No Known Problems Maternal Grandfather     No Known Problems Father     No Known Problems Mother     No Known Problems Brother     No Known Problems Sister      Social History     Tobacco Use    Smoking status: Never    Smokeless tobacco: Never   Substance Use Topics    Alcohol use: Not Currently    Drug use: Never        Review of Systems:  Review of Systems  Constitutional:  Negative for chills and fever.   Gastrointestinal:  Positive for abdominal pain. Negative for constipation and diarrhea.   Genitourinary:  Positive for pelvic pain. Negative for bladder incontinence, decreased libido, dysmenorrhea, dyspareunia, dysuria, flank pain, frequency, genital sores, hematuria, hot flashes, menorrhagia, menstrual problem, urgency, vaginal bleeding, vaginal discharge, vaginal pain, urinary incontinence, postcoital bleeding, postmenopausal bleeding, vaginal dryness and vaginal odor.    OBJECTIVE:     Vital Signs (Most Recent)              Physical Exam:  Physical Exam    deferred    ASSESSMENT/PLAN:     1. Chronic RLQ pain  - Case Request Operating Room: LAPAROSCOPY, DIAGNOSTIC  - Full code; Standing  - Vital signs; Standing  - Insert peripheral IV; Standing  - [place]eg[place] to Gravity; Standing  - POCT glucose; Standing  - Notify physician if BS > 180 for hysterectomy patients; Standing  - Chlorhexidine (CHG) 2% Wipes; Standing  - Notify Physician/Vital Signs Parameters Urine output less than 0.5mL/kg/hr (with indwelling catheter) or 30 mL/hr (without indwelling catheter) or blood glucose greater than 200 mg/dL; Standing  - Notify physician; Standing  - Notify Physician - Potential Need of Opioid Reversal; Standing  - Diet NPO; Standing  - Chlorohexidine Gluconate Bath; Standing  - Place in Outpatient; Standing  - Place sequential compression device; Standing  - Comprehensive  metabolic panel; Standing  - CBC auto differential; Standing  - Urinalysis, Reflex to Urine Culture; Standing  - Type & Screen Pre Op; Standing    2. Pelvic pain  - Case Request Operating Room: LAPAROSCOPY, DIAGNOSTIC  - Full code; Standing  - Vital signs; Standing  - Insert peripheral IV; Standing  - [place]as to Gravity; Standing  - POCT glucose; Standing  - Notify physician if BS > 180 for hysterectomy patients; Standing  - Chlorhexidine (CHG) 2% Wipes; Standing  - Notify Physician/Vital Signs Parameters Urine output less than 0.5mL/kg/hr (with indwelling catheter) or 30 mL/hr (without indwelling catheter) or blood glucose greater than 200 mg/dL; Standing  - Notify physician; Standing  - Notify Physician - Potential Need of Opioid Reversal; Standing  - Diet NPO; Standing  - Chlorohexidine Gluconate Bath; Standing  - Place in Outpatient; Standing  - Place sequential compression device; Standing  - Comprehensive metabolic panel; Standing  - CBC auto differential; Standing  - Urinalysis, Reflex to Urine Culture; Standing  - Type & Screen Pre Op; Standing        PLAN:  Discussed diagnostic laparoscopy procedure in detail including potential risks associated. Desires to move forward with procedure at this time.   Consent given, NPO after midnight prior to procedure.   Will plan for dx lap on 7/12/23.     RTC Post op

## 2023-06-20 DIAGNOSIS — E78.5 HYPERLIPIDEMIA, UNSPECIFIED HYPERLIPIDEMIA TYPE: ICD-10-CM

## 2023-06-21 RX ORDER — ROSUVASTATIN CALCIUM 40 MG/1
40 TABLET, COATED ORAL NIGHTLY
Qty: 30 TABLET | Refills: 4 | Status: SHIPPED | OUTPATIENT
Start: 2023-06-21 | End: 2023-08-09 | Stop reason: SDUPTHER

## 2023-07-05 ENCOUNTER — HOSPITAL ENCOUNTER (OUTPATIENT)
Dept: PREADMISSION TESTING | Facility: HOSPITAL | Age: 53
Discharge: HOME | End: 2023-07-05
Payer: MEDICAID

## 2023-07-05 VITALS — BODY MASS INDEX: 44.72 KG/M2 | HEIGHT: 62 IN | WEIGHT: 243 LBS

## 2023-07-05 DIAGNOSIS — R10.31 CHRONIC RLQ PAIN: ICD-10-CM

## 2023-07-05 DIAGNOSIS — R10.2 PELVIC PAIN: ICD-10-CM

## 2023-07-05 DIAGNOSIS — G89.29 CHRONIC RLQ PAIN: ICD-10-CM

## 2023-07-05 LAB
APPEARANCE UR: CLEAR
BASOPHILS # BLD AUTO: 0.04 X10(3)/MCL (ref 0.01–0.08)
BASOPHILS NFR BLD AUTO: 0.7 % (ref 0.1–1.2)
BILIRUB UR QL STRIP.AUTO: NEGATIVE MG/DL
COLOR UR: YELLOW
EOSINOPHIL # BLD AUTO: 0.08 X10(3)/MCL (ref 0.04–0.36)
EOSINOPHIL NFR BLD AUTO: 1.3 % (ref 0.7–7)
ERYTHROCYTE [DISTWIDTH] IN BLOOD BY AUTOMATED COUNT: 14.3 % (ref 11–14.5)
GLUCOSE UR QL STRIP.AUTO: NEGATIVE MG/DL
HCT VFR BLD AUTO: 40.5 % (ref 36–48)
HGB BLD-MCNC: 13 G/DL (ref 11.8–16)
IMM GRANULOCYTES # BLD AUTO: 0.03 X10(3)/MCL (ref 0–0.03)
IMM GRANULOCYTES NFR BLD AUTO: 0.5 % (ref 0–0.5)
KETONES UR QL STRIP.AUTO: NEGATIVE MG/DL
LEUKOCYTE ESTERASE UR QL STRIP.AUTO: NEGATIVE UNIT/L
LYMPHOCYTES # BLD AUTO: 2.26 X10(3)/MCL (ref 1.16–3.74)
LYMPHOCYTES NFR BLD AUTO: 37.7 % (ref 20–55)
MCH RBC QN AUTO: 26.7 PG (ref 27–34)
MCHC RBC AUTO-ENTMCNC: 32.1 G/DL (ref 31–37)
MCV RBC AUTO: 83.3 FL (ref 79–99)
MONOCYTES # BLD AUTO: 0.56 X10(3)/MCL (ref 0.24–0.36)
MONOCYTES NFR BLD AUTO: 9.3 % (ref 4.7–12.5)
NEUTROPHILS # BLD AUTO: 3.02 X10(3)/MCL (ref 1.56–6.13)
NEUTROPHILS NFR BLD AUTO: 50.5 % (ref 37–73)
NITRITE UR QL STRIP.AUTO: NEGATIVE
NRBC BLD AUTO-RTO: 0 %
PH UR STRIP.AUTO: 6.5 [PH]
PLATELET # BLD AUTO: 235 X10(3)/MCL (ref 140–371)
PMV BLD AUTO: 11.5 FL (ref 9.4–12.4)
PROT UR QL STRIP.AUTO: NEGATIVE MG/DL
RBC # BLD AUTO: 4.86 X10(6)/MCL (ref 4–5.1)
RBC UR QL AUTO: NEGATIVE UNIT/L
SP GR UR STRIP.AUTO: 1.02
UROBILINOGEN UR STRIP-ACNC: 0.2 MG/DL
WBC # SPEC AUTO: 5.99 X10(3)/MCL (ref 4–11.5)

## 2023-07-05 PROCEDURE — 81003 URINALYSIS AUTO W/O SCOPE: CPT | Performed by: OBSTETRICS & GYNECOLOGY

## 2023-07-05 PROCEDURE — 85025 COMPLETE CBC W/AUTO DIFF WBC: CPT | Performed by: OBSTETRICS & GYNECOLOGY

## 2023-07-05 NOTE — DISCHARGE INSTRUCTIONS

## 2023-07-11 ENCOUNTER — ANESTHESIA EVENT (OUTPATIENT)
Dept: SURGERY | Facility: HOSPITAL | Age: 53
End: 2023-07-11
Payer: MEDICAID

## 2023-07-11 ENCOUNTER — APPOINTMENT (OUTPATIENT)
Dept: LAB | Facility: HOSPITAL | Age: 53
End: 2023-07-11
Attending: OBSTETRICS & GYNECOLOGY
Payer: MEDICAID

## 2023-07-11 DIAGNOSIS — Z01.818 PREOP TESTING: Primary | ICD-10-CM

## 2023-07-11 LAB
ABO AND RH: NORMAL
ANTIBODY SCREEN: NORMAL
B-HCG SERPL QL: NEGATIVE
SPECIMEN OUTDATE: NORMAL

## 2023-07-11 PROCEDURE — 86900 BLOOD TYPING SEROLOGIC ABO: CPT | Performed by: OBSTETRICS & GYNECOLOGY

## 2023-07-11 PROCEDURE — 81025 URINE PREGNANCY TEST: CPT | Performed by: OBSTETRICS & GYNECOLOGY

## 2023-07-11 PROCEDURE — 36415 COLL VENOUS BLD VENIPUNCTURE: CPT

## 2023-07-11 NOTE — ANESTHESIA PREPROCEDURE EVALUATION
07/11/2023  Kaylyn Whyte is a 52 y.o., female.      Pre-op Assessment    I have reviewed the Patient Summary Reports.     I have reviewed the Nursing Notes. I have reviewed the NPO Status.   I have reviewed the Medications.     Review of Systems  Anesthesia Hx:  No problems with previous Anesthesia  Denies Family Hx of Anesthesia complications.   Denies Personal Hx of Anesthesia complications.   Hematology/Oncology:  Hematology Normal   Oncology Normal     EENT/Dental:EENT/Dental Normal   Cardiovascular:   Exercise tolerance: poor Hypertension    Pulmonary:  Pulmonary Normal    Renal/:  Renal/ Normal     Hepatic/GI:  Hepatic/GI Normal    Musculoskeletal:  Musculoskeletal Normal    Neurological:   Neuromuscular Disease,    Endocrine:  Endocrine Normal  Obesity / BMI > 30, Morbid Obesity / BMI > 40  Dermatological:  Skin Normal    Psych:   Psychiatric History          Physical Exam  General: Well nourished, Cooperative, Alert and Oriented    Airway:  Mallampati: II / III  Mouth Opening: Normal  TM Distance: Normal  Tongue: Normal  Neck ROM: Normal ROM    Dental:  Partial Dentures        Anesthesia Plan  Type of Anesthesia, risks & benefits discussed:    Anesthesia Type: Gen ETT  Intra-op Monitoring Plan: Standard ASA Monitors  Post Op Pain Control Plan: multimodal analgesia  Induction:  IV  Airway Plan: Direct  Informed Consent: Informed consent signed with the Patient and all parties understand the risks and agree with anesthesia plan.  All questions answered. Patient consented to blood products? Yes  ASA Score: 3  Day of Surgery Review of History & Physical: H&P Update referred to the surgeon/provider.I have interviewed and examined the patient. I have reviewed the patient's H&P dated: There are no significant changes.     Ready For Surgery From Anesthesia Perspective.     .

## 2023-07-12 ENCOUNTER — HOSPITAL ENCOUNTER (OUTPATIENT)
Facility: HOSPITAL | Age: 53
Discharge: HOME | End: 2023-07-12
Attending: OBSTETRICS & GYNECOLOGY | Admitting: OBSTETRICS & GYNECOLOGY
Payer: MEDICAID

## 2023-07-12 ENCOUNTER — ANESTHESIA (OUTPATIENT)
Dept: SURGERY | Facility: HOSPITAL | Age: 53
End: 2023-07-12
Payer: MEDICAID

## 2023-07-12 VITALS
DIASTOLIC BLOOD PRESSURE: 69 MMHG | SYSTOLIC BLOOD PRESSURE: 147 MMHG | TEMPERATURE: 97 F | HEART RATE: 70 BPM | BODY MASS INDEX: 44.72 KG/M2 | OXYGEN SATURATION: 98 % | RESPIRATION RATE: 20 BRPM | WEIGHT: 243 LBS | HEIGHT: 62 IN

## 2023-07-12 DIAGNOSIS — R10.31 CHRONIC RLQ PAIN: ICD-10-CM

## 2023-07-12 DIAGNOSIS — R10.2 PELVIC PAIN: ICD-10-CM

## 2023-07-12 DIAGNOSIS — G89.29 CHRONIC RLQ PAIN: ICD-10-CM

## 2023-07-12 DIAGNOSIS — Z98.890 S/P EXPLORATORY LAPAROTOMY: Primary | ICD-10-CM

## 2023-07-12 LAB
ABORH RETYPE: NORMAL
ALBUMIN SERPL-MCNC: 4.4 G/DL (ref 3.4–5)
ALBUMIN/GLOB SERPL: 1.5 RATIO
ALP SERPL-CCNC: 104 UNIT/L (ref 50–144)
ALT SERPL-CCNC: 28 UNIT/L (ref 1–45)
ANION GAP SERPL CALC-SCNC: 7 MEQ/L (ref 2–13)
AST SERPL-CCNC: 27 UNIT/L (ref 14–36)
BILIRUBIN DIRECT+TOT PNL SERPL-MCNC: 0.3 MG/DL (ref 0–1)
BUN SERPL-MCNC: 15 MG/DL (ref 7–20)
CALCIUM SERPL-MCNC: 9 MG/DL (ref 8.4–10.2)
CHLORIDE SERPL-SCNC: 105 MMOL/L (ref 98–110)
CO2 SERPL-SCNC: 29 MMOL/L (ref 21–32)
CREAT SERPL-MCNC: 0.61 MG/DL (ref 0.66–1.25)
CREAT/UREA NIT SERPL: 25 (ref 12–20)
GFR SERPLBLD CREATININE-BSD FMLA CKD-EPI: >90 MLS/MIN/1.73/M2
GLOBULIN SER-MCNC: 2.9 GM/DL (ref 2–3.9)
GLUCOSE SERPL-MCNC: 97 MG/DL (ref 70–115)
POTASSIUM SERPL-SCNC: 4.2 MMOL/L (ref 3.5–5.1)
PROT SERPL-MCNC: 7.3 GM/DL (ref 6.3–8.2)
SODIUM SERPL-SCNC: 141 MMOL/L (ref 135–145)

## 2023-07-12 PROCEDURE — 00840 ANES IPER PX LOWER ABD NOS: CPT | Performed by: OBSTETRICS & GYNECOLOGY

## 2023-07-12 PROCEDURE — 44970 LAPAROSCOPY APPENDECTOMY: CPT | Mod: AS,,,

## 2023-07-12 PROCEDURE — 44970 PR LAP,APPENDECTOMY: ICD-10-PCS | Mod: AS,,,

## 2023-07-12 PROCEDURE — 25000003 PHARM REV CODE 250: Performed by: OBSTETRICS & GYNECOLOGY

## 2023-07-12 PROCEDURE — 25000003 PHARM REV CODE 250: Performed by: NURSE ANESTHETIST, CERTIFIED REGISTERED

## 2023-07-12 PROCEDURE — 63600175 PHARM REV CODE 636 W HCPCS: Performed by: NURSE ANESTHETIST, CERTIFIED REGISTERED

## 2023-07-12 PROCEDURE — 49321 LAPAROSCOPY BIOPSY: CPT | Mod: AS,59,,

## 2023-07-12 PROCEDURE — 25000003 PHARM REV CODE 250

## 2023-07-12 PROCEDURE — 63600175 PHARM REV CODE 636 W HCPCS: Performed by: OBSTETRICS & GYNECOLOGY

## 2023-07-12 PROCEDURE — 36000708 HC OR TIME LEV III 1ST 15 MIN: Performed by: OBSTETRICS & GYNECOLOGY

## 2023-07-12 PROCEDURE — 36415 COLL VENOUS BLD VENIPUNCTURE: CPT | Performed by: OBSTETRICS & GYNECOLOGY

## 2023-07-12 PROCEDURE — 49321 PR LAP,DX SURGICAL ABD W/BIOPSY: ICD-10-PCS | Mod: 59,,, | Performed by: OBSTETRICS & GYNECOLOGY

## 2023-07-12 PROCEDURE — 49321 LAPAROSCOPY BIOPSY: CPT | Mod: 59,,, | Performed by: OBSTETRICS & GYNECOLOGY

## 2023-07-12 PROCEDURE — D9220A PRA ANESTHESIA: Mod: ,,, | Performed by: NURSE ANESTHETIST, CERTIFIED REGISTERED

## 2023-07-12 PROCEDURE — 71000033 HC RECOVERY, INTIAL HOUR: Performed by: OBSTETRICS & GYNECOLOGY

## 2023-07-12 PROCEDURE — 71000015 HC POSTOP RECOV 1ST HR: Performed by: OBSTETRICS & GYNECOLOGY

## 2023-07-12 PROCEDURE — D9220A PRA ANESTHESIA: ICD-10-PCS | Mod: ,,, | Performed by: NURSE ANESTHETIST, CERTIFIED REGISTERED

## 2023-07-12 PROCEDURE — 71000016 HC POSTOP RECOV ADDL HR: Performed by: OBSTETRICS & GYNECOLOGY

## 2023-07-12 PROCEDURE — 49321 PR LAP,DX SURGICAL ABD W/BIOPSY: ICD-10-PCS | Mod: AS,59,,

## 2023-07-12 PROCEDURE — 27201423 OPTIME MED/SURG SUP & DEVICES STERILE SUPPLY: Performed by: OBSTETRICS & GYNECOLOGY

## 2023-07-12 PROCEDURE — 63600175 PHARM REV CODE 636 W HCPCS

## 2023-07-12 PROCEDURE — 37000008 HC ANESTHESIA 1ST 15 MINUTES: Performed by: OBSTETRICS & GYNECOLOGY

## 2023-07-12 PROCEDURE — 36000709 HC OR TIME LEV III EA ADD 15 MIN: Performed by: OBSTETRICS & GYNECOLOGY

## 2023-07-12 PROCEDURE — 44970 LAPAROSCOPY APPENDECTOMY: CPT | Mod: 22,,, | Performed by: OBSTETRICS & GYNECOLOGY

## 2023-07-12 PROCEDURE — 37000009 HC ANESTHESIA EA ADD 15 MINS: Performed by: OBSTETRICS & GYNECOLOGY

## 2023-07-12 PROCEDURE — 44970 PR LAP,APPENDECTOMY: ICD-10-PCS | Mod: 22,,, | Performed by: OBSTETRICS & GYNECOLOGY

## 2023-07-12 PROCEDURE — 80053 COMPREHEN METABOLIC PANEL: CPT | Performed by: OBSTETRICS & GYNECOLOGY

## 2023-07-12 RX ORDER — ONDANSETRON 2 MG/ML
INJECTION INTRAMUSCULAR; INTRAVENOUS
Status: DISCONTINUED | OUTPATIENT
Start: 2023-07-12 | End: 2023-07-12

## 2023-07-12 RX ORDER — HYDROCODONE BITARTRATE AND ACETAMINOPHEN 7.5; 325 MG/1; MG/1
1 TABLET ORAL EVERY 6 HOURS PRN
Qty: 10 TABLET | Refills: 0 | Status: SHIPPED | OUTPATIENT
Start: 2023-07-12 | End: 2024-02-19

## 2023-07-12 RX ORDER — DEXAMETHASONE SODIUM PHOSPHATE 4 MG/ML
INJECTION, SOLUTION INTRA-ARTICULAR; INTRALESIONAL; INTRAMUSCULAR; INTRAVENOUS; SOFT TISSUE
Status: DISCONTINUED | OUTPATIENT
Start: 2023-07-12 | End: 2023-07-12

## 2023-07-12 RX ORDER — MUPIROCIN 20 MG/G
OINTMENT TOPICAL
Status: DISCONTINUED | OUTPATIENT
Start: 2023-07-12 | End: 2023-07-12 | Stop reason: HOSPADM

## 2023-07-12 RX ORDER — CLINDAMYCIN PHOSPHATE 900 MG/50ML
900 INJECTION, SOLUTION INTRAVENOUS
Status: COMPLETED | OUTPATIENT
Start: 2023-07-12 | End: 2023-07-12

## 2023-07-12 RX ORDER — FAMOTIDINE 20 MG/1
20 TABLET, FILM COATED ORAL
Status: DISCONTINUED | OUTPATIENT
Start: 2023-07-12 | End: 2023-07-12 | Stop reason: HOSPADM

## 2023-07-12 RX ORDER — MIDAZOLAM HYDROCHLORIDE 1 MG/ML
2 INJECTION INTRAMUSCULAR; INTRAVENOUS
Status: COMPLETED | OUTPATIENT
Start: 2023-07-12 | End: 2023-07-12

## 2023-07-12 RX ORDER — SODIUM CHLORIDE 9 MG/ML
INJECTION, SOLUTION INTRAVENOUS CONTINUOUS
Status: DISCONTINUED | OUTPATIENT
Start: 2023-07-12 | End: 2023-07-12 | Stop reason: HOSPADM

## 2023-07-12 RX ORDER — NEOSTIGMINE METHYLSULFATE 1 MG/ML
INJECTION, SOLUTION INTRAVENOUS
Status: DISCONTINUED | OUTPATIENT
Start: 2023-07-12 | End: 2023-07-12

## 2023-07-12 RX ORDER — ONDANSETRON 4 MG/1
8 TABLET, ORALLY DISINTEGRATING ORAL EVERY 8 HOURS PRN
Status: DISCONTINUED | OUTPATIENT
Start: 2023-07-12 | End: 2023-07-12 | Stop reason: HOSPADM

## 2023-07-12 RX ORDER — PROCHLORPERAZINE EDISYLATE 5 MG/ML
5 INJECTION INTRAMUSCULAR; INTRAVENOUS EVERY 6 HOURS PRN
Status: DISCONTINUED | OUTPATIENT
Start: 2023-07-12 | End: 2023-07-12 | Stop reason: HOSPADM

## 2023-07-12 RX ORDER — PROPOFOL 10 MG/ML
VIAL (ML) INTRAVENOUS
Status: DISCONTINUED | OUTPATIENT
Start: 2023-07-12 | End: 2023-07-12

## 2023-07-12 RX ORDER — HYDROCODONE BITARTRATE AND ACETAMINOPHEN 5; 325 MG/1; MG/1
1 TABLET ORAL EVERY 4 HOURS PRN
Status: DISCONTINUED | OUTPATIENT
Start: 2023-07-12 | End: 2023-07-12 | Stop reason: HOSPADM

## 2023-07-12 RX ORDER — DIPHENHYDRAMINE HYDROCHLORIDE 50 MG/ML
25 INJECTION INTRAMUSCULAR; INTRAVENOUS EVERY 4 HOURS PRN
Status: DISCONTINUED | OUTPATIENT
Start: 2023-07-12 | End: 2023-07-12 | Stop reason: HOSPADM

## 2023-07-12 RX ORDER — KETOROLAC TROMETHAMINE 30 MG/ML
INJECTION, SOLUTION INTRAMUSCULAR; INTRAVENOUS
Status: DISCONTINUED | OUTPATIENT
Start: 2023-07-12 | End: 2023-07-12

## 2023-07-12 RX ORDER — HYDROMORPHONE HYDROCHLORIDE 1 MG/ML
1 INJECTION, SOLUTION INTRAMUSCULAR; INTRAVENOUS; SUBCUTANEOUS EVERY 6 HOURS PRN
Status: DISCONTINUED | OUTPATIENT
Start: 2023-07-12 | End: 2023-07-12 | Stop reason: HOSPADM

## 2023-07-12 RX ORDER — SODIUM CHLORIDE, SODIUM LACTATE, POTASSIUM CHLORIDE, CALCIUM CHLORIDE 600; 310; 30; 20 MG/100ML; MG/100ML; MG/100ML; MG/100ML
INJECTION, SOLUTION INTRAVENOUS CONTINUOUS
Status: DISCONTINUED | OUTPATIENT
Start: 2023-07-12 | End: 2023-07-12 | Stop reason: HOSPADM

## 2023-07-12 RX ORDER — OXYCODONE AND ACETAMINOPHEN 10; 325 MG/1; MG/1
1 TABLET ORAL EVERY 4 HOURS PRN
Status: DISCONTINUED | OUTPATIENT
Start: 2023-07-12 | End: 2023-07-12 | Stop reason: HOSPADM

## 2023-07-12 RX ORDER — DIPHENHYDRAMINE HCL 25 MG
25 CAPSULE ORAL EVERY 4 HOURS PRN
Status: DISCONTINUED | OUTPATIENT
Start: 2023-07-12 | End: 2023-07-12 | Stop reason: HOSPADM

## 2023-07-12 RX ORDER — FAMOTIDINE 20 MG/1
20 TABLET, FILM COATED ORAL
Status: COMPLETED | OUTPATIENT
Start: 2023-07-12 | End: 2023-07-12

## 2023-07-12 RX ORDER — BUPIVACAINE HYDROCHLORIDE 5 MG/ML
INJECTION, SOLUTION EPIDURAL; INTRACAUDAL
Status: DISCONTINUED | OUTPATIENT
Start: 2023-07-12 | End: 2023-07-12 | Stop reason: HOSPADM

## 2023-07-12 RX ORDER — IBUPROFEN 600 MG/1
600 TABLET ORAL EVERY 6 HOURS PRN
Status: DISCONTINUED | OUTPATIENT
Start: 2023-07-12 | End: 2023-07-12 | Stop reason: HOSPADM

## 2023-07-12 RX ORDER — FENTANYL CITRATE 50 UG/ML
INJECTION, SOLUTION INTRAMUSCULAR; INTRAVENOUS
Status: DISCONTINUED | OUTPATIENT
Start: 2023-07-12 | End: 2023-07-12

## 2023-07-12 RX ORDER — TRIPROLIDINE/PSEUDOEPHEDRINE 2.5MG-60MG
30 TABLET ORAL EVERY 6 HOURS PRN
Qty: 480 ML | Refills: 2 | Status: SHIPPED | OUTPATIENT
Start: 2023-07-12 | End: 2024-02-19

## 2023-07-12 RX ORDER — LIDOCAINE HYDROCHLORIDE 20 MG/ML
INJECTION INTRAVENOUS
Status: DISCONTINUED | OUTPATIENT
Start: 2023-07-12 | End: 2023-07-12

## 2023-07-12 RX ORDER — LABETALOL HYDROCHLORIDE 5 MG/ML
INJECTION, SOLUTION INTRAVENOUS
Status: DISCONTINUED | OUTPATIENT
Start: 2023-07-12 | End: 2023-07-12

## 2023-07-12 RX ORDER — VECURONIUM BROMIDE FOR INJECTION 1 MG/ML
INJECTION, POWDER, LYOPHILIZED, FOR SOLUTION INTRAVENOUS
Status: DISCONTINUED | OUTPATIENT
Start: 2023-07-12 | End: 2023-07-12

## 2023-07-12 RX ADMIN — DEXAMETHASONE SODIUM PHOSPHATE 8 MG: 4 INJECTION, SOLUTION INTRA-ARTICULAR; INTRALESIONAL; INTRAMUSCULAR; INTRAVENOUS; SOFT TISSUE at 08:07

## 2023-07-12 RX ADMIN — FENTANYL CITRATE 50 MCG: 50 INJECTION, SOLUTION INTRAMUSCULAR; INTRAVENOUS at 08:07

## 2023-07-12 RX ADMIN — GENTAMICIN SULFATE 369 MG: 40 INJECTION, SOLUTION INTRAMUSCULAR; INTRAVENOUS at 08:07

## 2023-07-12 RX ADMIN — VECURONIUM BROMIDE 1 MG: 10 INJECTION, POWDER, FOR SOLUTION INTRAVENOUS at 10:07

## 2023-07-12 RX ADMIN — CLINDAMYCIN PHOSPHATE 900 MG: 900 INJECTION, SOLUTION INTRAVENOUS at 08:07

## 2023-07-12 RX ADMIN — VECURONIUM BROMIDE 6 MG: 10 INJECTION, POWDER, FOR SOLUTION INTRAVENOUS at 08:07

## 2023-07-12 RX ADMIN — FAMOTIDINE 20 MG: 20 TABLET ORAL at 06:07

## 2023-07-12 RX ADMIN — LABETALOL HYDROCHLORIDE 5 MG: 5 INJECTION, SOLUTION INTRAVENOUS at 09:07

## 2023-07-12 RX ADMIN — FENTANYL CITRATE 100 MCG: 50 INJECTION, SOLUTION INTRAMUSCULAR; INTRAVENOUS at 09:07

## 2023-07-12 RX ADMIN — SODIUM CHLORIDE: 9 INJECTION, SOLUTION INTRAVENOUS at 06:07

## 2023-07-12 RX ADMIN — GLYCOPYRROLATE 0.4 MG: 0.2 INJECTION, SOLUTION INTRAMUSCULAR; INTRAVITREAL at 10:07

## 2023-07-12 RX ADMIN — LABETALOL HYDROCHLORIDE 5 MG: 5 INJECTION, SOLUTION INTRAVENOUS at 10:07

## 2023-07-12 RX ADMIN — MIDAZOLAM HYDROCHLORIDE 2 MG: 1 INJECTION, SOLUTION INTRAMUSCULAR; INTRAVENOUS at 08:07

## 2023-07-12 RX ADMIN — SODIUM CHLORIDE, POTASSIUM CHLORIDE, SODIUM LACTATE AND CALCIUM CHLORIDE: 600; 310; 30; 20 INJECTION, SOLUTION INTRAVENOUS at 12:07

## 2023-07-12 RX ADMIN — LIDOCAINE HYDROCHLORIDE 75 MG: 20 INJECTION, SOLUTION INTRAVENOUS at 08:07

## 2023-07-12 RX ADMIN — FENTANYL CITRATE 50 MCG: 50 INJECTION, SOLUTION INTRAMUSCULAR; INTRAVENOUS at 09:07

## 2023-07-12 RX ADMIN — FENTANYL CITRATE 50 MCG: 50 INJECTION, SOLUTION INTRAMUSCULAR; INTRAVENOUS at 10:07

## 2023-07-12 RX ADMIN — PROPOFOL 180 MG: 10 INJECTION, EMULSION INTRAVENOUS at 08:07

## 2023-07-12 RX ADMIN — KETOROLAC TROMETHAMINE 30 MG: 30 INJECTION, SOLUTION INTRAMUSCULAR; INTRAVENOUS at 10:07

## 2023-07-12 RX ADMIN — NEOSTIGMINE METHYLSULFATE 3 MG: 0.5 INJECTION INTRAVENOUS at 10:07

## 2023-07-12 RX ADMIN — HYDROCODONE BITARTRATE AND ACETAMINOPHEN 1 TABLET: 5; 325 TABLET ORAL at 12:07

## 2023-07-12 RX ADMIN — VECURONIUM BROMIDE 2 MG: 10 INJECTION, POWDER, FOR SOLUTION INTRAVENOUS at 09:07

## 2023-07-12 RX ADMIN — GLYCOPYRROLATE 0.2 MG: 0.2 INJECTION, SOLUTION INTRAMUSCULAR; INTRAVITREAL at 06:07

## 2023-07-12 RX ADMIN — ONDANSETRON 4 MG: 2 INJECTION INTRAMUSCULAR; INTRAVENOUS at 08:07

## 2023-07-12 RX ADMIN — FENTANYL CITRATE 100 MCG: 50 INJECTION, SOLUTION INTRAMUSCULAR; INTRAVENOUS at 08:07

## 2023-07-12 NOTE — ANESTHESIA PROCEDURE NOTES
Intubation    Date/Time: 7/12/2023 8:48 AM  Performed by: Tapan Ennis CRNA  Authorized by: Tapan Ennis CRNA     Intubation:     Induction:  Intravenous    Intubated:  Postinduction    Mask Ventilation:  Easy mask    Attempts:  1    Attempted By:  CRNA    Method of Intubation:  Direct    Blade:  Vidal 2    Laryngeal View Grade: Grade I - full view of cords      Difficult Airway Encountered?: No      Complications:  None    Airway Device:  Oral endotracheal tube    Airway Device Size:  7.0    Style/Cuff Inflation:  Cuffed    Inflation Amount (mL):  6    Tube secured:  21    Secured at:  The lips    Placement Verified By:  Capnometry    Complicating Factors:  None    Findings Post-Intubation:  BS equal bilateral and atraumatic/condition of teeth unchanged

## 2023-07-12 NOTE — BRIEF OP NOTE
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]    Procedure(s) (LRB):  LAPAROSCOPY, DIAGNOSTIC (N/A)  Lysis of adhesions  Appendectomy  Right peritoneal wall biopsy    Anesthesia: General    Operative Findings: see op note    Estimated Blood Loss: * No values recorded between 7/12/2023  9:12 AM and 7/12/2023 10:59 AM *         Specimens:   Specimen (24h ago, onward)       Start     Ordered    07/12/23 1001  Specimen to Pathology  RELEASE UPON ORDERING        Comments: Specimen A: Diagnosis: Chronic RLQ pain           References:    Click here for ordering Quick Tip   Question:  Release to patient  Answer:  Immediate    07/12/23 1001    07/12/23 0959  Specimen to Pathology General Surgery  Once        Comments: Diagnosis: chronic RLQ pain     References:    Click here for ordering Quick Tip   Question Answer Comment   Procedure Type: General Surgery    Specimen Source Appendix    Specimen Class: Routine/Screening    Clinical Information: appendix    Release to patient Immediate        07/12/23 1000                      Discharge Note    OUTCOME: Patient tolerated treatment/procedure well without complication and is now ready for discharge.    DISPOSITION: Home or Self Care    FINAL DIAGNOSIS:  Right lower quadrant pain    FOLLOWUP: In clinic    DISCHARGE INSTRUCTIONS:    Discharge Procedure Orders   Diet Adult Regular     Pelvic Rest     Lifting restrictions     Notify your health care provider if you experience any of the following:  temperature >100.4     Notify your health care provider if you experience any of the following:   persistent nausea and vomiting or diarrhea     Notify your health care provider if you experience any of the following:  severe uncontrolled pain     Notify your health care provider if you experience any of the following:  redness, tenderness, or signs of infection (pain, swelling, redness, odor or green/yellow discharge around incision site)     Notify your health care provider if you experience any of the following:  difficulty breathing or increased cough     Notify your health care provider if you experience any of the following:  severe persistent headache     Notify your health care provider if you experience any of the following:  worsening rash     Notify your health care provider if you experience any of the following:  persistent dizziness, light-headedness, or visual disturbances     Notify your health care provider if you experience any of the following:  increased confusion or weakness     No dressing needed     Activity as tolerated

## 2023-07-12 NOTE — ANESTHESIA POSTPROCEDURE EVALUATION
Anesthesia Post Evaluation    Patient: Kaylyn Whyte    Procedure(s) Performed: Procedure(s) (LRB):  LAPAROSCOPY, DIAGNOSTIC (N/A)    Final Anesthesia Type: general      Patient location during evaluation: PACU  Patient participation: Yes- Able to Participate  Level of consciousness: awake and alert, awake and oriented  Post-procedure vital signs: reviewed and stable  Pain management: adequate  Airway patency: patent    PONV status at discharge: No PONV  Anesthetic complications: no      Cardiovascular status: blood pressure returned to baseline  Respiratory status: unassisted, room air and spontaneous ventilation  Hydration status: euvolemic  Follow-up not needed.          Vitals Value Taken Time   /81 07/12/23 1101   Temp 36.2 °C (97.1 °F) 07/12/23 1101   Pulse 66 07/12/23 1102   Resp 18 07/12/23 1101   SpO2 94 % 07/12/23 1102   Vitals shown include unvalidated device data.      No case tracking events are documented in the log.      Pain/Oleg Score: Oleg Score: 5 (7/12/2023 11:01 AM)

## 2023-07-12 NOTE — OP NOTE
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  She was placed on the OR table, and after induction of anesthesia, placed in the dorsal lithotomy position in [place] stirrups and prepped and draped in standard sterile fashion.      A moist sponge stick was placed in the vagina for manipulation of the vaginal cuff.  Attention was then turned to the abdomen.  A 1 cm incision was made in the skin just below the umbilicus.  The abdomen was tented up and a Veress needle was inserted.  The abdomen was then insufflated to 15 mmHg.  A 5 mm port was placed.  Camera was inserted.  Patient was placed in Trendelenburg position.  Two additional 5 mm ports were then placed to the left and right lower quadrants.  Bowel was unable to be mobilized from the pelvis due to adhesions.  Findings included adhesions of omentum to the anterior abdominal wall;  adhesions of 2 loops of small bowel to the posterior cul-de-sac, vaginal cuff, and left pelvic side wall; enlarged, firm, elongated appendix with adhesions to the right abdominal side wall and pelvic brim;  and an area of cystic scar tissue on the right pelvic side wall resembling remnant ovarian tissue.       A fourth port was placed in the suprapubic area to help with manipulation.  We started by freeing adhesions of the small bowel to eh pelvic side wall, vaginal cuff, and cul-de-sac.  The small bowel was  mobilized and moved out of the pelvis.  The appendix was then freed of its adhesions and decision was made to remove it due to its large size and firm nature.  It was freed full mobilizing it and allowing access the appendiceal base.  The appendiceal artery was isolated, clipped, and cut.  Hemostasis was observed.  The appendiceal base was skeletonized.  The RLQ port was replaced with a  12 mm port.  A GA stapler was used to staple across the base and free the distal appendix.  Appendix was removed in an endocatch bag and handed off for pathology.  We then turned our attention to the cystic scar tissue in the right pelvic side wall.  The ureter was identified running inferior and deep to the area of question.  Monopolar cautery and sharp dissection was used to remove the lesion.  The pelvic and appendiceal stump were irrigated with sterile water and suctioned dry.  Hemostasis of the peritoneal side wall area was achieved with cautery and SurgiSeal powder.      Total time performing lysis of adhesions was 1 hour.      Ports were removed.  Gas was allowed to expel from the patient's abdomen.  0 Vicryl suture was used to close the right lower quadrant fascia.  All skin incisions were closed with 4-0 Monocryl.  DermaBond was applied.     Patient was awakened and taken to the recovery room in stable condition having tolerated the procedure very well.  Sponge lap needle count correct.

## 2023-07-12 NOTE — DISCHARGE INSTRUCTIONS
DECREASED ACTIVITY TODAY, NO HEAVY LIFTING OR STRAINING, NO PUSHING OR PULLING, DON'T OPERATE MACHINERY/VEHICLE IN 24 HOURS, NO DRIVING TODAY OR WHILE TAKING PAIN MEDS,  SHOWERS ONLY, NO TUB BATHS, DECREASED ACTIVITY UNTIL AFTER FOLLOW UP APPOINTMENT.Exofin  PATIENT       AFTER-CARE INSTRUCTIONS          Your wound has been repaired using Exofin® High Viscosity topical tissue adhesive. The list below is a guide for you to understand and care for your wound following your procedure.          1. Keep the wound dry.     You may occasionally and briefly wet your wound in the shower or bath at the direction of your physician, but do not soak or scrub the wound area. After showering or bathing, gently blot your wound dry with a soft towel.       2. Avoid Topical Medications     Do not apply liquid or ointment medications, lotions, creams, petroleum jelly, mineral oils or any other product to your wound while the Exofin® adhesive film is in place.         3. Do not rub, scratch, or pick at the wound.     Doing so may compromise the integrity of the wound closure and cause scaring.        4. Protect the wound from prolonged sunlight exposure.     Do not use tanning lamps while the film is in place.        5. Check wound appearance.      Some swelling, redness, and pain are common with all wounds and normally will go away as the wound heals. If swelling, redness, or pain increases, or if the wound feels warm to the touch, contact your doctor. Also contact your doctor if the wound edges reopen or separate.        6. Exofin® will naturally slough off between 5 and 10 days after the procedure.     By this time, your wound should be sufficiently healed. This information is not intended as a substitute for the advice of a physician. For more detailed information, talk to your doctor. Your doctor can choose the best treatment for you in your particular circumstances.               For additional questions and concerns, please  consult your doctor.

## 2023-07-17 ENCOUNTER — TELEPHONE (OUTPATIENT)
Dept: OBSTETRICS AND GYNECOLOGY | Facility: CLINIC | Age: 53
End: 2023-07-17
Payer: MEDICAID

## 2023-07-17 NOTE — TELEPHONE ENCOUNTER
----- Message from Azalea Crawford sent at 7/17/2023  9:14 AM CDT -----  Regarding: Call Back  Type:  Patient Returning Call    Who Called:Pt  Who Left Message for Patient:  Does the patient know what this is regarding?:  Would the patient rather a call back or a response via [facility]?   Best Call Back Number:[phone]  Additional Information: Pt is asking when she is able to return to work.

## 2023-07-26 ENCOUNTER — OFFICE VISIT (OUTPATIENT)
Dept: OBSTETRICS AND GYNECOLOGY | Facility: CLINIC | Age: 53
End: 2023-07-26
Payer: MEDICAID

## 2023-07-26 VITALS
DIASTOLIC BLOOD PRESSURE: 100 MMHG | HEIGHT: 62 IN | WEIGHT: 238.38 LBS | BODY MASS INDEX: 43.87 KG/M2 | TEMPERATURE: 97 F | SYSTOLIC BLOOD PRESSURE: 140 MMHG

## 2023-07-26 DIAGNOSIS — G89.29 CHRONIC RLQ PAIN: Primary | ICD-10-CM

## 2023-07-26 DIAGNOSIS — Z90.49 HISTORY OF APPENDECTOMY: ICD-10-CM

## 2023-07-26 DIAGNOSIS — R10.31 CHRONIC RLQ PAIN: Primary | ICD-10-CM

## 2023-07-26 PROBLEM — Z98.890 S/P EXPLORATORY LAPAROTOMY: Status: RESOLVED | Noted: 2023-07-12 | Resolved: 2023-07-26

## 2023-07-26 PROCEDURE — 3008F PR BODY MASS INDEX (BMI) DOCUMENTED: ICD-10-PCS | Mod: CPTII,,, | Performed by: OBSTETRICS & GYNECOLOGY

## 2023-07-26 PROCEDURE — 1159F PR MEDICATION LIST DOCUMENTED IN MEDICAL RECORD: ICD-10-PCS | Mod: CPTII,,, | Performed by: OBSTETRICS & GYNECOLOGY

## 2023-07-26 PROCEDURE — 99024 POSTOP FOLLOW-UP VISIT: CPT | Mod: ,,, | Performed by: OBSTETRICS & GYNECOLOGY

## 2023-07-26 PROCEDURE — 3080F PR MOST RECENT DIASTOLIC BLOOD PRESSURE >= 90 MM HG: ICD-10-PCS | Mod: CPTII,,, | Performed by: OBSTETRICS & GYNECOLOGY

## 2023-07-26 PROCEDURE — 3008F BODY MASS INDEX DOCD: CPT | Mod: CPTII,,, | Performed by: OBSTETRICS & GYNECOLOGY

## 2023-07-26 PROCEDURE — 3080F DIAST BP >= 90 MM HG: CPT | Mod: CPTII,,, | Performed by: OBSTETRICS & GYNECOLOGY

## 2023-07-26 PROCEDURE — 3077F PR MOST RECENT SYSTOLIC BLOOD PRESSURE >= 140 MM HG: ICD-10-PCS | Mod: CPTII,,, | Performed by: OBSTETRICS & GYNECOLOGY

## 2023-07-26 PROCEDURE — 1159F MED LIST DOCD IN RCRD: CPT | Mod: CPTII,,, | Performed by: OBSTETRICS & GYNECOLOGY

## 2023-07-26 PROCEDURE — 99024 PR POST-OP FOLLOW-UP VISIT: ICD-10-PCS | Mod: ,,, | Performed by: OBSTETRICS & GYNECOLOGY

## 2023-07-26 PROCEDURE — 3077F SYST BP >= 140 MM HG: CPT | Mod: CPTII,,, | Performed by: OBSTETRICS & GYNECOLOGY

## 2023-07-26 RX ORDER — ATORVASTATIN CALCIUM 20 MG/1
20 TABLET, FILM COATED ORAL
COMMUNITY
Start: 2023-06-19 | End: 2023-08-09

## 2023-07-26 NOTE — PROGRESS NOTES
Chief Complaint     Post-op Evaluation (Reports no complaints. )    HPI:     Patient is a 52 y.o.  s/p  dx laparoscopy, lysis of adhesions and appendectomy 23 for chronic right lower quadrant pain after hysterectomy.  Says RLQ pain has improved.  Still mild 2/10 discomfort, but it is much less than before surgery.     Pathology: appendix, benign    S/P Hyst BS&O  MMG 2022, Benign  Hx abnormal MMG: No  Sexually active: Not currently  H/o STI: No   H/o Abnormal Pap: No   Colonoscopy: 2019 WNL, Repeat in 10 yrs           Past Medical History:   Diagnosis Date    Carpal tunnel syndrome 3/8/2023    Hyperlipidemia 3/8/2023    Taking rosuvastatin 20    Hypertension        Past Surgical History:   Procedure Laterality Date    DIAGNOSTIC LAPAROSCOPY N/A 2023    Procedure: LAPAROSCOPY, DIAGNOSTIC;  Surgeon: Ronaldo Villanueva MD;  Location: [facility];  Service: OB/GYN;  Laterality: N/A;  Laprascopic  Appendectomy      TOTAL ABDOMINAL HYSTERECTOMY W/ BILATERAL SALPINGOOPHORECTOMY         Family History   Problem Relation Age of Onset    No Known Problems Paternal Grandfather     No Known Problems Paternal Grandmother     Thyroid cancer Maternal Grandmother     No Known Problems Maternal Grandfather     No Known Problems Father     No Known Problems Mother     No Known Problems Brother     No Known Problems Sister        OB History          2    Para   2    Term   2            AB        Living   2         SAB        IAB        Ectopic        Multiple        Live Births   2                 Current Outpatient Medications on File Prior to Visit   Medication Sig Dispense Refill    furosemide (LASIX) 40 MG tablet Take 40 mg by mouth once daily.      ibuprofen 20 mg/mL oral liquid Take 30 mLs (600 mg total) by mouth every 6 (six) hours as needed for Pain. 480 mL 2    labetaloL (NORMODYNE) 100 MG tablet Take 100 mg by mouth 2 (two) times daily.      naproxen (NAPROSYN) 500 MG tablet Take 500 mg by  "mouth 2 (two) times daily as needed.      NIFEdipine (PROCARDIA-XL) 60 MG (OSM) 24 hr tablet Take 60 mg by mouth once daily.      potassium chloride SA (K-DUR,KLOR-CON) 20 MEQ tablet Take 20 mEq by mouth once daily.      rosuvastatin (CRESTOR) 40 MG Tab Take 1 tablet (40 mg total) by mouth every evening. 30 tablet 4    atorvastatin (LIPITOR) 20 MG tablet Take 20 mg by mouth.      HYDROcodone-acetaminophen (NORCO) 7.5-325 mg per tablet Take 1 tablet by mouth every 6 (six) hours as needed for Pain. (Patient not taking: Reported on 7/26/2023) 10 tablet 0     No current facility-administered medications on file prior to visit.       Review of Systems:       Review of Systems   Constitutional:  Negative for chills and fever.   Gastrointestinal:  Negative for abdominal pain, constipation and diarrhea.   Genitourinary:  Negative for bladder incontinence, decreased libido, dysmenorrhea, dyspareunia, dysuria, flank pain, frequency, genital sores, hematuria, hot flashes, menorrhagia, menstrual problem, pelvic pain, urgency, vaginal bleeding, vaginal discharge, vaginal pain, urinary incontinence, postcoital bleeding, postmenopausal bleeding, vaginal dryness and vaginal odor.      Physical Exam:    BP (!) 140/100 (BP Location: Left arm)   Temp 97.3 °F (36.3 °C)   Ht 5' 2" (1.575 m)   Wt 108.1 kg (238 lb 6.4 oz)   LMP  (LMP Unknown) Comment: HYST BS&O  BMI 43.60 kg/m²     Physical Exam   Gen :NAD  Abd: soft, NT. Wounds healing wll.     Assessment:   1. Chronic RLQ pain    2. History of appendectomy             Plan:   May return to normal activity  Recommend stool softener  RTC prn    "

## 2023-08-09 DIAGNOSIS — E78.5 HYPERLIPIDEMIA, UNSPECIFIED HYPERLIPIDEMIA TYPE: ICD-10-CM

## 2023-08-09 RX ORDER — ROSUVASTATIN CALCIUM 40 MG/1
40 TABLET, COATED ORAL NIGHTLY
Qty: 30 TABLET | Refills: 2 | Status: SHIPPED | OUTPATIENT
Start: 2023-08-09 | End: 2023-09-13 | Stop reason: SDUPTHER

## 2023-09-13 DIAGNOSIS — E78.5 HYPERLIPIDEMIA, UNSPECIFIED HYPERLIPIDEMIA TYPE: ICD-10-CM

## 2023-09-13 RX ORDER — ROSUVASTATIN CALCIUM 40 MG/1
40 TABLET, COATED ORAL NIGHTLY
Qty: 30 TABLET | Refills: 2 | Status: SHIPPED | OUTPATIENT
Start: 2023-09-13 | End: 2023-11-28 | Stop reason: SDUPTHER

## 2023-11-06 ENCOUNTER — TELEPHONE (OUTPATIENT)
Dept: FAMILY MEDICINE | Facility: CLINIC | Age: 53
End: 2023-11-06
Payer: MEDICAID

## 2023-11-28 DIAGNOSIS — E78.5 HYPERLIPIDEMIA, UNSPECIFIED HYPERLIPIDEMIA TYPE: ICD-10-CM

## 2023-11-28 RX ORDER — ROSUVASTATIN CALCIUM 40 MG/1
40 TABLET, COATED ORAL NIGHTLY
Qty: 30 TABLET | Refills: 2 | Status: SHIPPED | OUTPATIENT
Start: 2023-11-28 | End: 2024-01-20 | Stop reason: SDUPTHER

## 2024-01-20 DIAGNOSIS — E78.5 HYPERLIPIDEMIA, UNSPECIFIED HYPERLIPIDEMIA TYPE: ICD-10-CM

## 2024-01-22 RX ORDER — ROSUVASTATIN CALCIUM 40 MG/1
40 TABLET, COATED ORAL NIGHTLY
Qty: 30 TABLET | Refills: 2 | Status: SHIPPED | OUTPATIENT
Start: 2024-01-22 | End: 2024-02-28 | Stop reason: SDUPTHER

## 2024-02-16 PROCEDURE — 83036 HEMOGLOBIN GLYCOSYLATED A1C: CPT | Performed by: NURSE PRACTITIONER

## 2024-02-16 PROCEDURE — 80061 LIPID PANEL: CPT | Performed by: NURSE PRACTITIONER

## 2024-02-16 PROCEDURE — 85025 COMPLETE CBC W/AUTO DIFF WBC: CPT | Performed by: NURSE PRACTITIONER

## 2024-02-16 PROCEDURE — 84443 ASSAY THYROID STIM HORMONE: CPT | Performed by: NURSE PRACTITIONER

## 2024-02-16 PROCEDURE — 80053 COMPREHEN METABOLIC PANEL: CPT | Performed by: NURSE PRACTITIONER

## 2024-02-19 ENCOUNTER — OFFICE VISIT (OUTPATIENT)
Dept: FAMILY MEDICINE | Facility: CLINIC | Age: 54
End: 2024-02-19
Payer: MEDICAID

## 2024-02-19 VITALS
HEART RATE: 97 BPM | WEIGHT: 248 LBS | BODY MASS INDEX: 45.64 KG/M2 | TEMPERATURE: 97 F | SYSTOLIC BLOOD PRESSURE: 130 MMHG | DIASTOLIC BLOOD PRESSURE: 80 MMHG | HEIGHT: 62 IN | OXYGEN SATURATION: 98 %

## 2024-02-19 DIAGNOSIS — E66.01 CLASS 3 SEVERE OBESITY DUE TO EXCESS CALORIES WITH SERIOUS COMORBIDITY AND BODY MASS INDEX (BMI) OF 45.0 TO 49.9 IN ADULT: ICD-10-CM

## 2024-02-19 DIAGNOSIS — G47.33 OBSTRUCTIVE SLEEP APNEA: ICD-10-CM

## 2024-02-19 DIAGNOSIS — Z28.21 INFLUENZA VACCINATION DECLINED BY PATIENT: ICD-10-CM

## 2024-02-19 DIAGNOSIS — Z12.11 SCREENING FOR MALIGNANT NEOPLASM OF COLON: ICD-10-CM

## 2024-02-19 DIAGNOSIS — I10 PRIMARY HYPERTENSION: ICD-10-CM

## 2024-02-19 DIAGNOSIS — Z00.00 WELL ADULT EXAM: Primary | ICD-10-CM

## 2024-02-19 DIAGNOSIS — E78.49 OTHER HYPERLIPIDEMIA: ICD-10-CM

## 2024-02-19 DIAGNOSIS — Z12.31 SCREENING MAMMOGRAM FOR BREAST CANCER: ICD-10-CM

## 2024-02-19 PROBLEM — E66.813 CLASS 3 SEVERE OBESITY DUE TO EXCESS CALORIES WITH SERIOUS COMORBIDITY AND BODY MASS INDEX (BMI) OF 45.0 TO 49.9 IN ADULT: Status: ACTIVE | Noted: 2023-03-08

## 2024-02-19 PROCEDURE — 3079F DIAST BP 80-89 MM HG: CPT | Mod: CPTII,,, | Performed by: NURSE PRACTITIONER

## 2024-02-19 PROCEDURE — 3044F HG A1C LEVEL LT 7.0%: CPT | Mod: CPTII,,, | Performed by: NURSE PRACTITIONER

## 2024-02-19 PROCEDURE — 1160F RVW MEDS BY RX/DR IN RCRD: CPT | Mod: CPTII,,, | Performed by: NURSE PRACTITIONER

## 2024-02-19 PROCEDURE — 3008F BODY MASS INDEX DOCD: CPT | Mod: CPTII,,, | Performed by: NURSE PRACTITIONER

## 2024-02-19 PROCEDURE — 1159F MED LIST DOCD IN RCRD: CPT | Mod: CPTII,,, | Performed by: NURSE PRACTITIONER

## 2024-02-19 PROCEDURE — 3075F SYST BP GE 130 - 139MM HG: CPT | Mod: CPTII,,, | Performed by: NURSE PRACTITIONER

## 2024-02-19 PROCEDURE — 99396 PREV VISIT EST AGE 40-64: CPT | Mod: ,,, | Performed by: NURSE PRACTITIONER

## 2024-02-19 NOTE — ASSESSMENT & PLAN NOTE
Lipid Panel:  Lab Results   Component Value Date    CHOL 187 02/16/2024    HDL 60 02/16/2024    DLDL 102.0 (H) 02/16/2024    TRIG 52 02/16/2024    AST 24 02/16/2024    ALT 26 02/16/2024    ALKPHOS 94 02/16/2024    LABPROT 7.8 02/16/2024    ALBUMIN 4.5 02/16/2024

## 2024-02-19 NOTE — PROGRESS NOTES
Patient ID: Kaylyn Whyte  : 1970    Chief Complaint: Annual Exam (Wellness)    Allergies: Patient is allergic to penicillins.     History of Present Illness:  The patient is a 53 y.o. Black or  female who presents to clinic for annual wellness visit.    Diet and nutrition:  Diet is high in salt, high in fat, low in fiber, high caloric intake, high carbohydrate meals, high calcium intake.    Fracture risk: No history of fracture, no recent unexplained fracture.    Physical activity:  Does not exercise on a regular basis, good physical condition.    Depression risks:  + history of depression, never feel sad, empty, or tearful, no sleep disturbances, no agitation, no loss of energy, no feelings of worthlessness or guilt, no thoughts of suicide.    Orientation:  No disorientation to time, no disorientation to place.    Concentration and memory:  No decreased concentration ability, no memory lapses or loss, does not forget words.    Speech forward/motor difficulties: No speech difficulties, no difficulty expressing formulated concepts, no difficulty with fine manipulative tasks, no difficulty writing forward/copying, no slowed reaction time, does not knock things over when trying to pick them up.    Fall risk assessment:  No frequent falls while walking, no fall in the past year, no dizziness forward/vertigo, no fear of falling.  Hearing:  No loss of hearing, does not wear hearing aids.    Vision:  No vision problems, does wear glasses.    Activity of daily living: Able to bathe with limited or no assistance, able to control urination and bowels, able to dress with limited or no assistance, able to feed self with limited or no assistance, able to get out of chair or bed with limited or no assistance, able to [place] with limited or no assistance, able to toilet with limited are no assistance.    Activities of daily living:  Able to do housework with limited or no assistance, able to grocery  shop with limited or no assistance, able to manage medications with limited or no assistance, able to manage money with limited or no assistance, able to prepare meals with limited or no assistance, able to use the phone with limited or no assistance.    Screenings: due for vaccinations, due for breast cancer screening, not due for cervical cancer screening, due for colorectal cancer screening.             Past Medical History:  has a past medical history of Carpal tunnel syndrome, Hyperlipidemia, and Hypertension.    Surgical History:  has a past surgical history that includes Total abdominal hysterectomy w/ bilateral salpingoophorectomy (2013) and Diagnostic laparoscopy (N/A, 7/12/2023).    Family History: family history includes No Known Problems in her brother, father, maternal grandfather, mother, paternal grandfather, paternal grandmother, and sister; Thyroid cancer in her maternal grandmother.    Social History:  reports that she has never smoked. She has never used smokeless tobacco. She reports that she does not currently use alcohol. She reports that she does not use drugs.    Care Team: Patient Care Team:  Kostas Paris APRN as PCP - General (Internal Medicine)  Ronaldo Villanueva MD as Consulting Physician (Obstetrics and Gynecology)  [facility]  Miguel Antonio MD as Consulting Physician (Cardiology)     Current Medications:  Current Outpatient Medications   Medication Instructions    furosemide (LASIX) 40 mg, Oral, Daily    labetaloL (NORMODYNE) 100 mg, Oral, 2 times daily    naproxen (NAPROSYN) 500 mg, Oral, 2 times daily PRN    NIFEdipine (PROCARDIA-XL) 60 mg, Oral, Daily    potassium chloride SA (K-DUR,KLOR-CON) 20 MEQ tablet 20 mEq, Oral, Daily    rosuvastatin (CRESTOR) 40 mg, Oral, Nightly       Review of Systems   Constitutional:  Negative for activity change, fever and unexpected weight change.   HENT:  Negative for hearing loss, rhinorrhea and trouble swallowing.    Eyes:  Negative for  "discharge and visual disturbance.   Respiratory:  Negative for choking, chest tightness, shortness of breath and wheezing.    Cardiovascular:  Negative for chest pain and palpitations.   Gastrointestinal:  Negative for blood in stool, constipation, diarrhea and vomiting.   Endocrine: Negative for polydipsia, polyphagia and polyuria.   Genitourinary:  Negative for difficulty urinating, dysuria, hematuria and menstrual problem.   Musculoskeletal:  Negative for arthralgias, joint swelling and neck pain.   Neurological:  Negative for weakness and headaches.   Psychiatric/Behavioral:  Negative for confusion, decreased concentration, dysphoric mood and suicidal ideas. The patient is not nervous/anxious.         Visit Vitals  /80 (BP Location: Right arm)   Pulse 97   Temp 97 °F (36.1 °C) (Temporal)   Ht 5' 2" (1.575 m)   Wt 112.5 kg (248 lb)   LMP  (LMP Unknown)   SpO2 98%   BMI 45.36 kg/m²       Physical Exam  Vitals reviewed.   Constitutional:       General: She is not in acute distress.     Appearance: Normal appearance. She is obese.   HENT:      Head: Normocephalic and atraumatic.      Right Ear: Tympanic membrane, ear canal and external ear normal.      Left Ear: Tympanic membrane, ear canal and external ear normal.      Nose: Nose normal. No congestion.      Mouth/Throat:      Mouth: Mucous membranes are moist.      Pharynx: Oropharynx is clear. No oropharyngeal exudate or posterior oropharyngeal erythema.   Eyes:      Conjunctiva/sclera: Conjunctivae normal.      Comments: Wearing eyeglasses   Cardiovascular:      Rate and Rhythm: Normal rate and regular rhythm.      Pulses: Normal pulses.      Heart sounds: No murmur heard.  Pulmonary:      Effort: Pulmonary effort is normal.      Breath sounds: Normal breath sounds.   Musculoskeletal:         General: No swelling, tenderness or deformity. Normal range of motion.      Cervical back: Neck supple.   Lymphadenopathy:      Cervical: No cervical adenopathy. "   Skin:     General: Skin is warm and dry.      Coloration: Skin is not jaundiced.      Findings: No rash.   Neurological:      Mental Status: She is alert and oriented to person, place, and time.      Cranial Nerves: No cranial nerve deficit.   Psychiatric:         Mood and Affect: Mood normal.         Behavior: Behavior normal.          Labs Reviewed:  Chemistry:  Lab Results   Component Value Date     02/16/2024    K 4.1 02/16/2024    CHLORIDE 109 02/16/2024    BUN 15.0 02/16/2024    CREATININE 0.66 02/16/2024    EGFRNORACEVR >90 02/16/2024    GLUCOSE 98 02/16/2024    CALCIUM 9.6 02/16/2024    ALKPHOS 94 02/16/2024    LABPROT 7.8 02/16/2024    ALBUMIN 4.5 02/16/2024    AST 24 02/16/2024    ALT 26 02/16/2024    TSH 2.240 02/16/2024        Lab Results   Component Value Date    HGBA1C 5.5 02/16/2024        Hematology:  Lab Results   Component Value Date    WBC 5.89 02/16/2024    RBC 4.68 02/16/2024    HGB 12.4 02/16/2024    HCT 38.9 02/16/2024    MCV 83.1 02/16/2024    MCH 26.5 (L) 02/16/2024    MCHC 31.9 02/16/2024    RDW 14.0 02/16/2024     02/16/2024    MPV 12.1 02/16/2024       Lipid Panel:  Lab Results   Component Value Date    CHOL 187 02/16/2024    HDL 60 02/16/2024    DLDL 102.0 (H) 02/16/2024    TRIG 52 02/16/2024        Assessment & Plan:  1. Well adult exam  Overview:  Cervical Cancer Screening- hysterectomy, established with Dr. Villanueva  Breast Cancer Screening- mammo ordered   Osteoporosis Screening- not due  Colon Cancer Screening -  cologuard ordered  Eye Exam- Recommend annually. Established with Dr. Regalado  Dental Exam- Recommend biannually.        Orders:  -     CBC Auto Differential; Future; Expected date: 02/19/2025  -     Comprehensive Metabolic Panel; Future; Expected date: 02/19/2025  -     Lipid Panel; Future; Expected date: 02/19/2025  -     TSH; Future; Expected date: 02/19/2025  -     Hemoglobin A1C; Future; Expected date: 02/19/2025    2. Other  hyperlipidemia  Overview:  Improved with rosuvastastin 40mg     Assessment & Plan:  Lipid Panel:  Lab Results   Component Value Date    CHOL 187 02/16/2024    HDL 60 02/16/2024    DLDL 102.0 (H) 02/16/2024    TRIG 52 02/16/2024    AST 24 02/16/2024    ALT 26 02/16/2024    ALKPHOS 94 02/16/2024    LABPROT 7.8 02/16/2024    ALBUMIN 4.5 02/16/2024          Orders:  -     CBC Auto Differential; Future; Expected date: 02/19/2025  -     Comprehensive Metabolic Panel; Future; Expected date: 02/19/2025  -     Lipid Panel; Future; Expected date: 02/19/2025    3. Primary hypertension  Overview:  Bp stable labetalol 100 b.i.d., furosemide 40 daily, KCl 20, nifedipine 60.  Established with Dr. Antonio    Orders:  -     CBC Auto Differential; Future; Expected date: 02/19/2025  -     Comprehensive Metabolic Panel; Future; Expected date: 02/19/2025  -     TSH; Future; Expected date: 02/19/2025    4. Screening mammogram for breast cancer  Comments:  mammogram ordered  Overview:  Mammo ordered      Orders:  -     Mammo Digital Screening Bilat w/ Lawson; Future; Expected date: 02/19/2024    5. Influenza vaccination declined by patient    6. Screening for malignant neoplasm of colon  Comments:  cologuard ordered  Orders:  -     Cologuard Screening (Multitarget Stool DNA); Future; Expected date: 02/19/2024    7. Obstructive sleep apnea  Overview:  Received cpap on 1/31/24, setting adjusted 2/14/24, now able to wear equipment comfortably, rtc in 1 month to complete compliance paperwork      8. Class 3 severe obesity due to excess calories with serious comorbidity and body mass index (BMI) of 45.0 to 49.9 in adult  Assessment & Plan:  This is a chronic and a comorbid condition that affected my decision making today. Patient educated on importance of diet and exercise.  Weight loss goals discussed. Recommended 30-45 minutes of exercise five days a week.  In addition, counseled patient on importance of low fat diet, limiting carbohydrate  intake, and increasing protein and vegetable intake. Hand outs provided. All questions answered.       Orders:  -     CBC Auto Differential; Future; Expected date: 02/19/2025  -     Comprehensive Metabolic Panel; Future; Expected date: 02/19/2025  -     Lipid Panel; Future; Expected date: 02/19/2025  -     TSH; Future; Expected date: 02/19/2025  -     Hemoglobin A1C; Future; Expected date: 02/19/2025             Vaccinations:  Immunization History   Administered Date(s) Administered    COVID-19, vector-nr, rS-Ad26, PF ([employer]) 03/12/2021    Influenza - Quadrivalent - PF *Preferred* (6 months and older) 11/09/2022       Future Appointments   Date Time Provider Department Center   3/19/2024  7:15 AM Kostas Paris APRN [facility]   3/12/2025  8:00 AM LAB, [facility] LABORATORY DRAW STATION [facility]   3/19/2025  9:00 [facility]       Follow up for 1)1 mo f/u sleep apnea virtual -request record prior to visit 2),1 yr Wellness fasting labs. Call sooner if needed.    ABEBE BAKER    Lab Frequency Next Occurrence   Ambulatory referral/consult to Orthopedics Once 03/27/2023   Ambulatory referral/consult to Sleep Disorders Once 03/27/2023   Comprehensive Metabolic Panel Once 06/20/2023   Lipid Panel Once 06/20/2023   Ambulatory referral/consult to Orthopedics Once 04/19/2023

## 2024-02-28 DIAGNOSIS — E78.5 HYPERLIPIDEMIA, UNSPECIFIED HYPERLIPIDEMIA TYPE: ICD-10-CM

## 2024-02-28 RX ORDER — ROSUVASTATIN CALCIUM 40 MG/1
40 TABLET, COATED ORAL NIGHTLY
Qty: 30 TABLET | Refills: 0 | Status: SHIPPED | OUTPATIENT
Start: 2024-02-28

## 2024-03-14 ENCOUNTER — PATIENT MESSAGE (OUTPATIENT)
Dept: FAMILY MEDICINE | Facility: CLINIC | Age: 54
End: 2024-03-14
Payer: MEDICAID

## 2024-04-29 ENCOUNTER — PATIENT MESSAGE (OUTPATIENT)
Dept: ADMINISTRATIVE | Facility: HOSPITAL | Age: 54
End: 2024-04-29
Payer: MEDICAID

## 2024-05-20 PROBLEM — Z00.00 WELL ADULT EXAM: Status: RESOLVED | Noted: 2024-02-19 | Resolved: 2024-05-20

## 2024-06-19 ENCOUNTER — PATIENT MESSAGE (OUTPATIENT)
Facility: CLINIC | Age: 54
End: 2024-06-19
Payer: MEDICAID

## 2024-07-17 ENCOUNTER — PATIENT MESSAGE (OUTPATIENT)
Facility: CLINIC | Age: 54
End: 2024-07-17
Payer: MEDICAID

## 2024-10-29 ENCOUNTER — PATIENT MESSAGE (OUTPATIENT)
Facility: CLINIC | Age: 54
End: 2024-10-29
Payer: MEDICAID

## 2025-03-10 ENCOUNTER — TELEPHONE (OUTPATIENT)
Dept: FAMILY MEDICINE | Facility: CLINIC | Age: 55
End: 2025-03-10
Payer: COMMERCIAL

## (undated) DEVICE — DRAPE LEGGINGS CUFF 31X48IN

## (undated) DEVICE — GOWN POLY REINF BRTH SLV 2XL

## (undated) DEVICE — GLOVE PROTEXIS PI SYN SURG 8.5

## (undated) DEVICE — NDL SPINAL DISP 25GA X 15CM

## (undated) DEVICE — SLEEVE KII ADV FIX 5X100MM

## (undated) DEVICE — TRAY CATH URETHRAL FOLEY 16FR

## (undated) DEVICE — SUT MONOCYRL 4-0 PS2 UND

## (undated) DEVICE — BAG TISS RETRV MONARCH 10MM

## (undated) DEVICE — SCISSOR 5MMX35CM DIRECT DRIVE

## (undated) DEVICE — SYS EXOFIN SKIN CLOSURE 22CM

## (undated) DEVICE — TROCAR ENDO Z THREAD 12X100MM

## (undated) DEVICE — KIT CLEANER SCOPE SWAB CLOTH

## (undated) DEVICE — SCALPEL #11 BLADE STRL DISP

## (undated) DEVICE — STAPLER ECHELON FLEX GST 45MM

## (undated) DEVICE — APPLIER CLIP EPIX UNIV 5X34

## (undated) DEVICE — PAD PINK TRENDELENBURG POS XL

## (undated) DEVICE — NDL INSUFFLATION VERRES 120MM

## (undated) DEVICE — DISSECTOR EPIX LAPA 5MMX35CM

## (undated) DEVICE — SYR 10CC LUER LOCK

## (undated) DEVICE — APPLICATOR CHLORAPREP ORN 26ML

## (undated) DEVICE — TUBING 1/2 X 3/32 X 10 STERILE

## (undated) DEVICE — KIT PROCEDURE STER INLET CLOSU

## (undated) DEVICE — TROCAR KII FIOS ZTHREAD 11X100

## (undated) DEVICE — HEMOSTAT SURGICEL PWD 3G

## (undated) DEVICE — SYS HYDRO-SURG IRR SMOKE EVAC

## (undated) DEVICE — SYS CLSR LAPSCP PORT SITE

## (undated) DEVICE — JELLY KY LUBRICATING 5G PACKET

## (undated) DEVICE — RELOAD ECHELON ENDOPATH 45MM

## (undated) DEVICE — SYR ONLY LUER LOCK 20CC

## (undated) DEVICE — RELOAD PROXIMATE CUT BLUE 55MM

## (undated) DEVICE — Device

## (undated) DEVICE — KIT MAJOR SINGLE BASIN

## (undated) DEVICE — SUT 0 VICRYL / UR6 (J603)

## (undated) DEVICE — TROCAR ENDO Z THREAD KII 5X100

## (undated) DEVICE — DRAPE UNDER BUTTOCKS SUC PORT

## (undated) DEVICE — APPLICATOR SURGICEL ENDOSCOPIC